# Patient Record
Sex: MALE | Race: WHITE | NOT HISPANIC OR LATINO | Employment: OTHER | URBAN - METROPOLITAN AREA
[De-identification: names, ages, dates, MRNs, and addresses within clinical notes are randomized per-mention and may not be internally consistent; named-entity substitution may affect disease eponyms.]

---

## 2017-12-26 ENCOUNTER — HOSPITAL ENCOUNTER (EMERGENCY)
Facility: CLINIC | Age: 67
Discharge: HOME | End: 2017-12-26
Attending: EMERGENCY MEDICINE | Admitting: EMERGENCY MEDICINE
Payer: MEDICARE

## 2017-12-26 VITALS
HEART RATE: 66 BPM | HEIGHT: 70 IN | WEIGHT: 195 LBS | DIASTOLIC BLOOD PRESSURE: 77 MMHG | SYSTOLIC BLOOD PRESSURE: 133 MMHG | BODY MASS INDEX: 27.92 KG/M2 | TEMPERATURE: 97.7 F | RESPIRATION RATE: 16 BRPM | OXYGEN SATURATION: 99 %

## 2017-12-26 DIAGNOSIS — S61.012A LACERATION OF LEFT THUMB WITHOUT FOREIGN BODY, NAIL DAMAGE STATUS UNSPECIFIED, INITIAL ENCOUNTER: ICD-10-CM

## 2017-12-26 PROCEDURE — 99283 EMERGENCY DEPT VISIT LOW MDM: CPT

## 2017-12-26 ASSESSMENT — ENCOUNTER SYMPTOMS
WOUND: 1
COLOR CHANGE: 0
WEAKNESS: 0
JOINT SWELLING: 0
NUMBNESS: 0

## 2017-12-26 NOTE — ED AVS SNAPSHOT
Emergency Department    [address] [place] [address]    Phone:  [phone]    Fax:  [phone]                                       Beni Chau   MRN: [id number]    Department:   Emergency Department   Date of Visit:  12/26/2017           After Visit Summary Signature Page     I have received my discharge instructions, and my questions have been answered. I have discussed any challenges I see with this plan with the nurse or doctor.    ..........................................................................................................................................  Patient/Patient Representative Signature      ..........................................................................................................................................  Patient Representative Print Name and Relationship to Patient    ..................................................               ................................................  Date                                            Time    ..........................................................................................................................................  Reviewed by Signature/Title    ...................................................              ..............................................  Date                                                            Time

## 2017-12-26 NOTE — ED AVS SNAPSHOT
Emergency Department    [address] [facility] [address]    Phone:  [phone]    Fax:  [phone]                                       Beni Chau   MRN: [id number]    Department:   Emergency Department   Date of Visit:  12/26/2017           Patient Information     Date Of Birth          1950        Your diagnoses for this visit were:     Laceration of left thumb without foreign body, nail damage status unspecified, initial encounter        You were seen by Dejan De Jesus MD.      Follow-up Information     Follow up with Deion Shah.    Specialty:  Neurosurgery    Why:  As needed, If symptoms worsen    Contact information:    [facility]  [address] [place] [id number] [phone]          Follow up with  Emergency Department.    Specialty:  EMERGENCY MEDICINE    Why:  As needed, If symptoms worsen    Contact information:    [address] [facility] [id number] [phone]        Discharge Instructions          * LACERATION (All Closures)  A laceration is a cut through the skin. This will usually require stitches (sutures) or staples if it is deep. Minor cuts may be treated with a tape closure ( Steri-Strips ) or Dermabond skin glue.       HOME CARE:  PAIN MEDICINE: You may use acetaminophen (Tylenol) 650-1000 mg every 6 hours or ibuprofen (Motrin, Advil) 600 mg every 6-8 hours with food to control pain, if you are able to take these medicines. [NOTE: If you have chronic liver or kidney disease or ever had a stomach ulcer or GI bleeding, talk with your doctor before using these medicines.]  EXTREMITY, FACE or TRUNK WOUNDS:    Keep the wound clean and dry. If a bandage was applied and it becomes wet or dirty, replace it. Otherwise, leave it in place for the first 24 hours.    If stitches or staples were used, clean the wound daily. Protect the wound from sunlight and tanning lamps.    After removing the bandage, wash the area  with soap and water. Use a wet cotton swab (Q tip) to loosen and remove any blood or crust that forms.    After cleaning, apply a thin layer of Polysporin or Bacitracin ointment. This will keep the wound clean and make it easier to remove the stitches or staples. Reapply a fresh bandage.    You may remove the bandage to shower as usual after the first 24 hours, but do not soak the area in water (no swimming) until the stitches or staples are removed.    If Steri-Strips were used, keep the area clean and dry. If it becomes wet, blot it dry with a towel. It is okay to take a brief shower, but avoid scrubbing the area.    If Dermabond skin adhesive was used, do not scratch, rub or pick at the adhesive film. Do not place tape directly over the film. Do not apply liquid, ointment or creams to the wound while the film is in place. Do not clean the wound with peroxide and do not apply ointments. Avoid activities that cause heavy sweating until the film has fallen off. Protect the wound from prolonged exposure to sunlight or tanning lamps. You may shower as usual but do not soak the wound in water (no baths or swimming). The film will fall off by itself in 5-10 days.  SCALP WOUNDS: During the first two days, you may carefully rinse your hair in the shower to remove blood, glass or dirt particles. After two days, you may shower and shampoo your hair normally. Do not soak your scalp in the tub or go swimming until the stitches or staples have been removed.  MOUTH WOUNDS: Eat soft foods to reduce pain. If the cut is inside of your mouth, clean by rinsing after each meal and at bedtime with a mixture of equal parts water and Hydrogen Peroxide (do not swallow!). Or, you can use a cotton swab to directly apply Hydrogen Peroxide onto the cut.  After the wound is done healing, use sunscreen over the area whenever exposed for the next 6 minths to avoid a darker scar.     FOLLOW UP: Most skin wounds heal within ten days. Mouth and  facial wounds heal within five days. However, even with proper treatment, a wound infection may sometimes occur. Therefore, you should check the wound daily for signs of infection listed below.  Stitches should be removed from the face within five days; stitches and staples should be removed from other parts of the body within 7-10 days. Unless you are told to come back to the emergency room, you may have your doctor or urgent care remove the stitches. If dissolving stitches were used in the mouth, these will fall out or dissolve without the need for removal. If tape closures ( Steri-Strips ) were used, remove them yourself if they have not fallen off after 7 days. If Dermabond skin glue was used, the film will fall off by itself in 5-10 days.      GET PROMPT MEDICAL ATTENTION  if any of the following occur:    Increasing pain in the wound    Redness, swelling or pus coming from the wound    Fever over 101 F (38.3 C) oral    If stitches or staples come apart or fall out or if Steri-Strips fall off before seven days    If the wound edges re-open    Bleeding not controlled by direct pressure    0138-9135 [employer] [address] [place] [address]. All rights reserved. This information is not intended as a substitute for professional medical care. Always follow your healthcare professional's instructions.  This information has been modified by your health care provider with permission from the publisher.      Old Laceration: Not Sutured  A laceration is a cut through the skin. This will usually require stitches if it is deep. However, if a laceration remains open for too long, the risk of infection increases. In your case, too much time has passed before coming for treatment. The danger of infection from stitching at this time is too high. That is why your wound was not stitched.  If the wound is spread open, it will heal by filling in from the bottom and sides. A wound that is not stitched  may take 1 to 4 weeks to heal, depending on the size of the opening. You will probably have a visible scar. You can discuss revision of the scar with your healthcare provider at a later time.  Home care  The following guidelines will help you care for your laceration at home:    Keep the wound clean and dry. If a bandage was applied and it becomes wet or dirty, replace it. Otherwise, leave it in place for the first 24 hours, then change it once a day or as directed.    Clean the wound daily:    After removing any bandage, wash the area with soap and water. Use a wet cotton swab to loosen and remove any blood or crust that forms.    Talk with your healthcare provider before applying any antibiotic ointment to the wound. Reapply a fresh bandage.    You may remove the bandage to shower as usual after the first 24 hours, but don't soak the area in water (no tub baths or swimming) until the wound heals or your provider tells you it's OK.  Avoid activities that may reinjure your wound.    The healthcare provider may prescribe an antibiotic cream or ointment to prevent infection.     If you are at high risk for infection, or the wound is grossly contaminated, your provider may prescribe an oral antibiotic medicine to prevent infection. Don't stop using this medicine until you have finished the prescribed course, or the provider tells you to stop.    The healthcare provider may also prescribe medicine for pain. Follow instructions for taking these medicines.    Check the wound daily for signs of infection listed below.  Follow-up care  Follow up with your healthcare provider, or as advised.  When to seek medical advice  Call your healthcare provider right away if any of these occur:    Wound bleeding not controlled by direct pressure    Signs of infection, including increasing pain in the wound, increasing wound redness or swelling, or pus or bad odor coming from the wound    Fever of 100.4 F (38. C) or higher or as  directed by your healthcare provider    Wound edges re-open    Wound changes colors    Numbness around the wound     Decreased movement around the injured area  Date Last Reviewed: 6/10/2015    2894-4750 [employer]. [address] [place] [address], [place] [address]. All rights reserved. This information is not intended as a substitute for professional medical care. Always follow your healthcare professional's instructions.          24 Hour Appointment Hotline       To make an appointment at any [facility], call [phone] ([phone]). If you don't have a family doctor or clinic, we will help you find one. [facility] clinics are conveniently located to serve the needs of you and your family.             Review of your medicines      Our records show that you are taking the medicines listed below. If these are incorrect, please call your family doctor or clinic.        Dose / Directions Last dose taken    aspirin 81 MG EC tablet   Dose:  81 mg   Indication:  Coronary artery disease        Take 1 tablet (81 mg) by mouth daily   Refills:  0        clopidogrel 75 MG tablet   Commonly known as:  PLAVIX   Dose:  75 mg   Quantity:  30 tablet        Take 1 tablet (75 mg) by mouth daily   Refills:  0        LISINOPRIL PO        Refills:  0        METOPROLOL SUCCINATE PO   Dose:  50 mg        Take 50 mg by mouth daily   Refills:  0        NITROSTAT SL   Dose:  0.4 mg        Place 0.4 mg under the tongue   Refills:  0        SIMVASTATIN PO   Dose:  40 mg        Take 40 mg by mouth At Bedtime   Refills:  0        VITAMIN D (CHOLECALCIFEROL) PO   Dose:  1000 Units        Take 1,000 Units by mouth daily   Refills:  0                Orders Needing Specimen Collection     None      Pending Results     No orders found from 12/24/2017 to 12/27/2017.            Pending Culture Results     No orders found from 12/24/2017 to 12/27/2017.            Pending Results Instructions     If you had any lab results that were  not finalized at the time of your Discharge, you can call the ED Lab Result RN at [phone]. You will be contacted by this team for any positive Lab results or changes in treatment. The nurses are available 7 days a week from 10A to 6:30P.  You can leave a message 24 hours per day and they will return your call.        Test Results From Your Hospital Stay               Clinical Quality Measure: Blood Pressure Screening     Your blood pressure was checked while you were in the emergency department today. The last reading we obtained was  BP: 133/77 . Please read the guidelines below about what these numbers mean and what you should do about them.  If your systolic blood pressure (the top number) is less than 120 and your diastolic blood pressure (the bottom number) is less than 80, then your blood pressure is normal. There is nothing more that you need to do about it.  If your systolic blood pressure (the top number) is 120-139 or your diastolic blood pressure (the bottom number) is 80-89, your blood pressure may be higher than it should be. You should have your blood pressure rechecked within a year by a primary care provider.  If your systolic blood pressure (the top number) is 140 or greater or your diastolic blood pressure (the bottom number) is 90 or greater, you may have high blood pressure. High blood pressure is treatable, but if left untreated over time it can put you at risk for heart attack, stroke, or kidney failure. You should have your blood pressure rechecked by a primary care provider within the next 4 weeks.  If your provider in the emergency department today gave you specific instructions to follow-up with your doctor or provider even sooner than that, you should follow that instruction and not wait for up to 4 weeks for your follow-up visit.        Thank you for choosing [facility]       Thank you for choosing [facility] for your care. Our goal is always to provide you with excellent care. Hearing  "back from our patients is one way we can continue to improve our services. Please take a few minutes to complete the written survey that you may receive in the mail after you visit with us. Thank you!        [facility] Information     [employer] lets you send messages to your doctor, view your test results, renew your prescriptions, schedule appointments and more. To sign up, go to [email or web address] . Click on \"Log in\" on the left side of the screen, which will take you to the Welcome page. Then click on \"Sign up Now\" on the right side of the page.     You will be asked to enter the access code listed below, as well as some personal information. Please follow the directions to create your username and password.     Your access code is: [email or web address]  Expires: 3/26/2018  6:44 AM     Your access code will  in 90 days. If you need help or a new code, please call your [facility] clinic or [phone].        Care EveryWhere ID     This is your Care EveryWhere ID. This could be used by other organizations to access your [facility] medical records  [id number]        Equal Access to Services     MARIANN GIRON : Hadjason Villalobos, mode [place], nevaeh wilkinson, claudia barajas . So [facility] [phone].    ATENCIÓN: Si habla español, tiene a barnes disposición servicios gratuitos de asistencia lingüística. Ford al [phone].    We comply with applicable federal civil rights laws and [place] laws. We do not discriminate on the basis of race, color, national origin, age, disability, sex, sexual orientation, or gender identity.            After Visit Summary       This is your record. Keep this with you and show to your community pharmacist(s) and doctor(s) at your next visit.                  "

## 2017-12-26 NOTE — DISCHARGE INSTRUCTIONS
* LACERATION (All Closures)  A laceration is a cut through the skin. This will usually require stitches (sutures) or staples if it is deep. Minor cuts may be treated with a tape closure ( Steri-Strips ) or Dermabond skin glue.       HOME CARE:  PAIN MEDICINE: You may use acetaminophen (Tylenol) 650-1000 mg every 6 hours or ibuprofen (Motrin, Advil) 600 mg every 6-8 hours with food to control pain, if you are able to take these medicines. [NOTE: If you have chronic liver or kidney disease or ever had a stomach ulcer or GI bleeding, talk with your doctor before using these medicines.]  EXTREMITY, FACE or TRUNK WOUNDS:    Keep the wound clean and dry. If a bandage was applied and it becomes wet or dirty, replace it. Otherwise, leave it in place for the first 24 hours.    If stitches or staples were used, clean the wound daily. Protect the wound from sunlight and tanning lamps.    After removing the bandage, wash the area with soap and water. Use a wet cotton swab (Q tip) to loosen and remove any blood or crust that forms.    After cleaning, apply a thin layer of Polysporin or Bacitracin ointment. This will keep the wound clean and make it easier to remove the stitches or staples. Reapply a fresh bandage.    You may remove the bandage to shower as usual after the first 24 hours, but do not soak the area in water (no swimming) until the stitches or staples are removed.    If Steri-Strips were used, keep the area clean and dry. If it becomes wet, blot it dry with a towel. It is okay to take a brief shower, but avoid scrubbing the area.    If Dermabond skin adhesive was used, do not scratch, rub or pick at the adhesive film. Do not place tape directly over the film. Do not apply liquid, ointment or creams to the wound while the film is in place. Do not clean the wound with peroxide and do not apply ointments. Avoid activities that cause heavy sweating until the film has fallen off. Protect the wound from prolonged  exposure to sunlight or tanning lamps. You may shower as usual but do not soak the wound in water (no baths or swimming). The film will fall off by itself in 5-10 days.  SCALP WOUNDS: During the first two days, you may carefully rinse your hair in the shower to remove blood, glass or dirt particles. After two days, you may shower and shampoo your hair normally. Do not soak your scalp in the tub or go swimming until the stitches or staples have been removed.  MOUTH WOUNDS: Eat soft foods to reduce pain. If the cut is inside of your mouth, clean by rinsing after each meal and at bedtime with a mixture of equal parts water and Hydrogen Peroxide (do not swallow!). Or, you can use a cotton swab to directly apply Hydrogen Peroxide onto the cut.  After the wound is done healing, use sunscreen over the area whenever exposed for the next 6 minths to avoid a darker scar.     FOLLOW UP: Most skin wounds heal within ten days. Mouth and facial wounds heal within five days. However, even with proper treatment, a wound infection may sometimes occur. Therefore, you should check the wound daily for signs of infection listed below.  Stitches should be removed from the face within five days; stitches and staples should be removed from other parts of the body within 7-10 days. Unless you are told to come back to the emergency room, you may have your doctor or urgent care remove the stitches. If dissolving stitches were used in the mouth, these will fall out or dissolve without the need for removal. If tape closures ( Steri-Strips ) were used, remove them yourself if they have not fallen off after 7 days. If Dermabond skin glue was used, the film will fall off by itself in 5-10 days.      GET PROMPT MEDICAL ATTENTION  if any of the following occur:    Increasing pain in the wound    Redness, swelling or pus coming from the wound    Fever over 101 F (38.3 C) oral    If stitches or staples come apart or fall out or if Steri-Strips fall  off before seven days    If the wound edges re-open    Bleeding not controlled by direct pressure    7089-7943 [employer]. [address] [place] [address], [place] [address]. All rights reserved. This information is not intended as a substitute for professional medical care. Always follow your healthcare professional's instructions.  This information has been modified by your health care provider with permission from the publisher.      Old Laceration: Not Sutured  A laceration is a cut through the skin. This will usually require stitches if it is deep. However, if a laceration remains open for too long, the risk of infection increases. In your case, too much time has passed before coming for treatment. The danger of infection from stitching at this time is too high. That is why your wound was not stitched.  If the wound is spread open, it will heal by filling in from the bottom and sides. A wound that is not stitched may take 1 to 4 weeks to heal, depending on the size of the opening. You will probably have a visible scar. You can discuss revision of the scar with your healthcare provider at a later time.  Home care  The following guidelines will help you care for your laceration at home:    Keep the wound clean and dry. If a bandage was applied and it becomes wet or dirty, replace it. Otherwise, leave it in place for the first 24 hours, then change it once a day or as directed.    Clean the wound daily:    After removing any bandage, wash the area with soap and water. Use a wet cotton swab to loosen and remove any blood or crust that forms.    Talk with your healthcare provider before applying any antibiotic ointment to the wound. Reapply a fresh bandage.    You may remove the bandage to shower as usual after the first 24 hours, but don't soak the area in water (no tub baths or swimming) until the wound heals or your provider tells you it's OK.  Avoid activities that may reinjure your wound.    The  healthcare provider may prescribe an antibiotic cream or ointment to prevent infection.     If you are at high risk for infection, or the wound is grossly contaminated, your provider may prescribe an oral antibiotic medicine to prevent infection. Don't stop using this medicine until you have finished the prescribed course, or the provider tells you to stop.    The healthcare provider may also prescribe medicine for pain. Follow instructions for taking these medicines.    Check the wound daily for signs of infection listed below.  Follow-up care  Follow up with your healthcare provider, or as advised.  When to seek medical advice  Call your healthcare provider right away if any of these occur:    Wound bleeding not controlled by direct pressure    Signs of infection, including increasing pain in the wound, increasing wound redness or swelling, or pus or bad odor coming from the wound    Fever of 100.4 F (38. C) or higher or as directed by your healthcare provider    Wound edges re-open    Wound changes colors    Numbness around the wound     Decreased movement around the injured area  Date Last Reviewed: 6/10/2015    9285-9252 [employer]. [address] [place] [address]. All rights reserved. This information is not intended as a substitute for professional medical care. Always follow your healthcare professional's instructions.

## 2017-12-26 NOTE — ED PROVIDER NOTES
"  History     Chief Complaint:  Laceration    HPI   Beni Chau is a 67 year old male who presents with a laceration. The patient presents stating that he accidentally cut his thumb on a knife last night at approximately 2000. He state she is on Plavix chronically. He denies any numbness, weakness, other injuries or symptoms.    Allergies:  Aleve  Oxycodone     Medications:    LISINOPRIL PO  clopidogrel (PLAVIX) 75 MG tablet  aspirin 81 MG EC tablet  Nitroglycerin (NITROSTAT SL)  METOPROLOL SUCCINATE PO  SIMVASTATIN PO  VITAMIN D, CHOLECALCIFEROL, PO    Problem List:    GI bleed     Past Medical History:    Hypertension    Past Surgical History:    Esophagoscopy, Gastroscopy, Duodenoscopy, Combined  Orthopedic surgery    Family History:    No pertinent family history.    Social History:  Smoking status: Former smoker  Alcohol use: Yes  Marital Status:  Single      Review of Systems   Musculoskeletal: Negative for joint swelling.   Skin: Positive for wound. Negative for color change.   Neurological: Negative for weakness and numbness.     Physical Exam     Patient Vitals for the past 24 hrs:   BP Temp Temp src Pulse Resp SpO2 Height Weight   12/26/17 0611 133/77 97.7  F (36.5  C) Oral 66 16 99 % 1.778 m (5' 10\") -   12/26/17 0608 - - - - - - - 88.5 kg (195 lb)         Physical Exam  General: Alert, interactive in mild distress  Head:  Scalp is atraumatic  Eyes:  The pupils are equal, round, and reactive to light    EOM's intact    No scleral icterus  ENT:      Nose:  The external nose is normal  Ears:  External ears are normal  Mouth/Throat: The oropharynx is normal    Mucus membranes are moist      Neck:  Normal range of motion.      There is no rigidity.    Trachea is in the midline         CV:  Regular rate and rhythm    No murmur   Resp:  Breath sounds are clear bilaterally    Non-labored, no retractions or accessory muscle use     MS:  Normal strength in all 4 extremities  Skin:  Warm and dry, No rash or " lesions noted. Distal tip of left thumb with 5mm x 5mm avulsion.  Neuro: Strength 5/5 x4.  Sensation intact  In all 4 extremities.        Psych:  Awake. Alert.  Normal affect.      Appropriate interactions.      Emergency Department Course   Emergency Department Course:  Nursing notes and vitals reviewed.  (0624) I performed an exam of the patient as documented above.    Findings and plan explained to the patient. Patient discharged home with instructions regarding supportive care, medications, and reasons to return. The importance of close follow-up was reviewed.  Impression & Plan    Medical Decision Making:  Findings and exam were consistent with uncomplicated laceration of the left thumb.. The wound was carefully evaluated and explored. Gel foam and sterile dressing applied to the area. There is no evidence at this time of bony injury, foreign body, deep space infection, or neurovascular injury. Follow up in 2-3 days time if concerns over healing. Possible complications (infection, scarring) were reviewed with the patient. Indications for immediate return to ER were reviewed and included but are not limited to, redness, fevers, drainage, increasing pain, high fevers, or other concerns.    Diagnosis:    ICD-10-CM   1. Laceration of left thumb without foreign body, nail damage status unspecified, initial encounter S61.012A       Disposition:  Patient is discharged to home.        I, Pablo Laura, am serving as a scribe on 12/26/2017 at 6:24 AM to personally document services performed by Dr. De Jesus based on my observations and the provider's statements to me.      Pablo Laura  12/26/2017    EMERGENCY DEPARTMENT       Neal, Dejan Paul MD  12/26/17 1542

## 2023-05-02 ENCOUNTER — APPOINTMENT (OUTPATIENT)
Dept: CT IMAGING | Facility: CLINIC | Age: 73
End: 2023-05-02
Attending: EMERGENCY MEDICINE
Payer: COMMERCIAL

## 2023-05-02 ENCOUNTER — HOSPITAL ENCOUNTER (EMERGENCY)
Facility: CLINIC | Age: 73
Discharge: HOME | End: 2023-05-02
Attending: EMERGENCY MEDICINE | Admitting: EMERGENCY MEDICINE
Payer: COMMERCIAL

## 2023-05-02 VITALS
BODY MASS INDEX: 30.35 KG/M2 | HEIGHT: 70 IN | SYSTOLIC BLOOD PRESSURE: 141 MMHG | TEMPERATURE: 97 F | WEIGHT: 212 LBS | DIASTOLIC BLOOD PRESSURE: 88 MMHG | HEART RATE: 61 BPM | RESPIRATION RATE: 16 BRPM | OXYGEN SATURATION: 96 %

## 2023-05-02 DIAGNOSIS — K80.20 CALCULUS OF GALLBLADDER WITHOUT CHOLECYSTITIS WITHOUT OBSTRUCTION: ICD-10-CM

## 2023-05-02 DIAGNOSIS — K40.90 RIGHT INGUINAL HERNIA: ICD-10-CM

## 2023-05-02 DIAGNOSIS — R39.11 URINARY HESITANCY: ICD-10-CM

## 2023-05-02 DIAGNOSIS — N40.0 ENLARGED PROSTATE: ICD-10-CM

## 2023-05-02 LAB
ALBUMIN SERPL BCG-MCNC: 4.5 G/DL (ref 3.5–5.2)
ALBUMIN UR-MCNC: NEGATIVE MG/DL
ALP SERPL-CCNC: 114 U/L (ref 40–129)
ALT SERPL W P-5'-P-CCNC: 29 U/L (ref 10–50)
ANION GAP SERPL CALCULATED.3IONS-SCNC: 14 MMOL/L (ref 7–15)
APPEARANCE UR: CLEAR
AST SERPL W P-5'-P-CCNC: 25 U/L (ref 10–50)
BASOPHILS # BLD AUTO: 0 10E3/UL (ref 0–0.2)
BASOPHILS NFR BLD AUTO: 1 %
BILIRUB SERPL-MCNC: 0.4 MG/DL
BILIRUB UR QL STRIP: NEGATIVE
BUN SERPL-MCNC: 14.9 MG/DL (ref 8–23)
CALCIUM SERPL-MCNC: 8.9 MG/DL (ref 8.8–10.2)
CHLORIDE SERPL-SCNC: 96 MMOL/L (ref 98–107)
COLOR UR AUTO: ABNORMAL
CREAT SERPL-MCNC: 0.92 MG/DL (ref 0.67–1.17)
DEPRECATED HCO3 PLAS-SCNC: 23 MMOL/L (ref 22–29)
EOSINOPHIL # BLD AUTO: 0.2 10E3/UL (ref 0–0.7)
EOSINOPHIL NFR BLD AUTO: 3 %
ERYTHROCYTE [DISTWIDTH] IN BLOOD BY AUTOMATED COUNT: 13.4 % (ref 10–15)
GFR SERPL CREATININE-BSD FRML MDRD: 87 ML/MIN/1.73M2
GLUCOSE SERPL-MCNC: 83 MG/DL (ref 70–99)
GLUCOSE UR STRIP-MCNC: NEGATIVE MG/DL
HCT VFR BLD AUTO: 36.4 % (ref 40–53)
HGB BLD-MCNC: 12.4 G/DL (ref 13.3–17.7)
HGB UR QL STRIP: ABNORMAL
IMM GRANULOCYTES # BLD: 0 10E3/UL
IMM GRANULOCYTES NFR BLD: 0 %
KETONES UR STRIP-MCNC: ABNORMAL MG/DL
LEUKOCYTE ESTERASE UR QL STRIP: NEGATIVE
LYMPHOCYTES # BLD AUTO: 2.4 10E3/UL (ref 0.8–5.3)
LYMPHOCYTES NFR BLD AUTO: 33 %
MCH RBC QN AUTO: 30.8 PG (ref 26.5–33)
MCHC RBC AUTO-ENTMCNC: 34.1 G/DL (ref 31.5–36.5)
MCV RBC AUTO: 91 FL (ref 78–100)
MONOCYTES # BLD AUTO: 0.6 10E3/UL (ref 0–1.3)
MONOCYTES NFR BLD AUTO: 9 %
MUCOUS THREADS #/AREA URNS LPF: PRESENT /LPF
NEUTROPHILS # BLD AUTO: 4 10E3/UL (ref 1.6–8.3)
NEUTROPHILS NFR BLD AUTO: 54 %
NITRATE UR QL: NEGATIVE
NRBC # BLD AUTO: 0 10E3/UL
NRBC BLD AUTO-RTO: 0 /100
PH UR STRIP: 5.5 [PH] (ref 5–7)
PLATELET # BLD AUTO: 291 10E3/UL (ref 150–450)
POTASSIUM SERPL-SCNC: 4.3 MMOL/L (ref 3.4–5.3)
PROT SERPL-MCNC: 7 G/DL (ref 6.4–8.3)
RBC # BLD AUTO: 4.02 10E6/UL (ref 4.4–5.9)
RBC URINE: 2 /HPF
SODIUM SERPL-SCNC: 133 MMOL/L (ref 136–145)
SP GR UR STRIP: 1.03 (ref 1–1.03)
SQUAMOUS EPITHELIAL: <1 /HPF
UROBILINOGEN UR STRIP-MCNC: NORMAL MG/DL
WBC # BLD AUTO: 7.3 10E3/UL (ref 4–11)
WBC URINE: <1 /HPF

## 2023-05-02 PROCEDURE — 36415 COLL VENOUS BLD VENIPUNCTURE: CPT | Performed by: EMERGENCY MEDICINE

## 2023-05-02 PROCEDURE — 81003 URINALYSIS AUTO W/O SCOPE: CPT | Performed by: EMERGENCY MEDICINE

## 2023-05-02 PROCEDURE — 250N000009 HC RX 250: Performed by: EMERGENCY MEDICINE

## 2023-05-02 PROCEDURE — 99285 EMERGENCY DEPT VISIT HI MDM: CPT | Mod: 25

## 2023-05-02 PROCEDURE — 85025 COMPLETE CBC W/AUTO DIFF WBC: CPT | Performed by: EMERGENCY MEDICINE

## 2023-05-02 PROCEDURE — 250N000011 HC RX IP 250 OP 636: Performed by: EMERGENCY MEDICINE

## 2023-05-02 PROCEDURE — G1010 CDSM STANSON: HCPCS

## 2023-05-02 PROCEDURE — 80053 COMPREHEN METABOLIC PANEL: CPT | Performed by: EMERGENCY MEDICINE

## 2023-05-02 RX ORDER — IOPAMIDOL 755 MG/ML
106 INJECTION, SOLUTION INTRAVASCULAR ONCE
Status: COMPLETED | OUTPATIENT
Start: 2023-05-02 | End: 2023-05-02

## 2023-05-02 RX ADMIN — IOPAMIDOL 106 ML: 755 INJECTION, SOLUTION INTRAVENOUS at 15:21

## 2023-05-02 RX ADMIN — SODIUM CHLORIDE 70 ML: 9 INJECTION, SOLUTION INTRAVENOUS at 15:21

## 2023-05-02 NOTE — ED TRIAGE NOTES
Pt BIBA from home with c/o LRQ abdominal pain and groin pain. Pain has been on-going but recently got worse. Patient also reports difficulty urinating.

## 2023-05-02 NOTE — ED PROVIDER NOTES
"  History     Chief Complaint:  Abdominal Pain and Groin Pain     The history is provided by the patient.      Beni Chau is a 72 year old male who presents to the ED via EMS for evaluation of several weeks of intermittent groin pain. The patient reports that he began experiencing intermittent right sided groin pain a couple months ago, worsening today. No known exacerbating factors or injuries. Patient states he has a history of left sided inguinal hernia which was repaired, but since this hernia he has had intermittent difficulty with urination and bowel movements. He reports a weak stream and difficulty urinating. He states he has intermittent constipation for several days and then will have multiple large bowel movements one day. He denies blood in stool. He reports no vomiting, nausea, fever, or testicular pain. No upper abdominal pain. Since arrival to the ED he reports that the right groin pain has now resolved. At times he does note a bulge in this area. He denies any other symptoms.       Independent Historian:   None - Patient Only    Review of External Notes:  none    ROS:  See HPI    Allergies:  Aleve  Oxycodone     Physical Exam     Patient Vitals for the past 24 hrs:   BP Temp Pulse Resp SpO2 Height Weight   05/02/23 1800 (!) 141/88 -- 61 -- 96 % -- --   05/02/23 1424 111/64 97  F (36.1  C) 68 16 95 % 1.778 m (5' 10\") 96.2 kg (212 lb)      Physical Exam  General: Well appearing, nontoxic. Resting comfortably  Head:  Scalp, face, and head appear normal  Eyes:  Pupils are equal, round    Conjunctivae non-injected and sclerae white  ENT:    The external nose is normal    Pinnae are normal  Neck:  Normal range of motion    There is no rigidity noted    Trachea is in the midline  CV:  Regular rate and rhythm     Normal S1/S2, no S3/S4    No murmur or rub. Radial pulses 2+ bilaterally.  Resp:  Lungs are clear and equal bilaterally  There is no tachypnea    No increased work of breathing    No rales, " wheezing, or rhonchi  GI:  Abdomen is soft, no rigidity or guarding    No distension, or mass    No tenderness or rebound tenderness  :  Normal circumcised male genitalia. Urethral meatus normal without bleeding or discharge. No lesions or rash. Testes non-tender to palpation in normal lie. No masses or swelling. No erythema. No palpable inguinal hernias.   MS:  Normal muscular tone  Skin:  No rash or acute skin lesions noted  Neuro: Awake and alert  Speech is normal and fluent  Moves all extremities spontaneously  Psych:  Normal affect. Appropriate interactions.      Emergency Department Course     Imaging:  CT Abdomen Pelvis w Contrast   Final Result   IMPRESSION:    1.  No acute abnormality identified.   2.  Small cholelithiasis.   3.  Colonic diverticula without diverticulitis.    4.  Small fat-containing right inguinal hernia without herniated   bowel.       ANDREW MAHER MD            SYSTEM ID:  [id number]         Report per radiology    Laboratory:  Labs Ordered and Resulted from Time of ED Arrival to Time of ED Departure   COMPREHENSIVE METABOLIC PANEL - Abnormal       Result Value    Sodium 133 (*)     Potassium 4.3      Chloride 96 (*)     Carbon Dioxide (CO2) 23      Anion Gap 14      Urea Nitrogen 14.9      Creatinine 0.92      Calcium 8.9      Glucose 83      Alkaline Phosphatase 114      AST 25      ALT 29      Protein Total 7.0      Albumin 4.5      Bilirubin Total 0.4      GFR Estimate 87     ROUTINE UA WITH MICROSCOPIC REFLEX TO CULTURE - Abnormal    Color Urine Light Yellow      Appearance Urine Clear      Glucose Urine Negative      Bilirubin Urine Negative      Ketones Urine Trace (*)     Specific Gravity Urine 1.026      Blood Urine Trace (*)     pH Urine 5.5      Protein Albumin Urine Negative      Urobilinogen Urine Normal      Nitrite Urine Negative      Leukocyte Esterase Urine Negative      Mucus Urine Present (*)     RBC Urine 2      WBC Urine <1      Squamous Epithelials Urine <1     CBC  WITH PLATELETS AND DIFFERENTIAL - Abnormal    WBC Count 7.3      RBC Count 4.02 (*)     Hemoglobin 12.4 (*)     Hematocrit 36.4 (*)     MCV 91      MCH 30.8      MCHC 34.1      RDW 13.4      Platelet Count 291      % Neutrophils 54      % Lymphocytes 33      % Monocytes 9      % Eosinophils 3      % Basophils 1      % Immature Granulocytes 0      NRBCs per 100 WBC 0      Absolute Neutrophils 4.0      Absolute Lymphocytes 2.4      Absolute Monocytes 0.6      Absolute Eosinophils 0.2      Absolute Basophils 0.0      Absolute Immature Granulocytes 0.0      Absolute NRBCs 0.0        Emergency Department Course & Assessments:     Interventions:  Medications   iopamidol (ISOVUE-370) solution 106 mL (106 mLs Intravenous $Given 5/2/23 1521)   Saline Flush (70 mLs Intravenous $Given 5/2/23 1521)      Assessments, Independent Interpretation, Consult/Discussion of Management/Tests:  ED Course as of 05/03/23 1034   Tue May 02, 2023   1810 I obtained history and examined the patient as noted above.      Social Determinants of Health affecting care:  None    Disposition:  The patient was discharged to home.     Impression & Plan      Medical Decision Making:  Beni Chau is a 72 year old male who presents to the ED for evaluation of intermittent right groin pain.  ED evaluation is consistent with right inguinal hernia.  Since arrival to the emergency department the pain has completely resolved.  CT scan shows persistent fat-containing right inguinal hernia although this is not appreciated on clinical examination.  No evidence of bowel obstruction or strangulation.  CT scan incidentally also noted cholelithiasis.  Patient had no upper abdominal pain whatsoever and this is asymptomatic and incidental at this time.  Patient also endorses ongoing urinary hesitancy, difficulty urinating and weak urinary stream.  CT scan shows prostatic enlargement.  Patient will need follow-up with urology for evaluation of these lower urinary  tract symptoms and evaluation for prostatic enlargement.  Laboratory studies are reassuring.  Findings and plan of care were discussed with the patient.  I recommended follow-up with general surgery in regards to the hernia and incidentally noted cholelithiasis.  Urology follow-up for urinary symptoms and enlarged prostate.  Referrals were placed for both.  No indication for hospitalization at this time.  Symptoms have completely resolved.  Patient is agreeable to plan of care.  Close return precautions are provided he was discharged in stable and improved condition.      Diagnosis:    ICD-10-CM    1. Right inguinal hernia  K40.90 Adult General Surg Referral    This was the cause of your pain today.      2. Enlarged prostate  N40.0 Adult Urology  Referral      3. Urinary hesitancy  R39.11 Adult Urology  Referral      4. Calculus of gallbladder without cholecystitis without obstruction  K80.20 Adult General Surg Referral    Incidentally noted on CT scan -follow-up with your primary care doctor or general surgeon.         I, Nick Blackwell, am serving as a scribe at 6:17 PM on 5/2/2023 to document services personally performed by Dr. Parnell, based on my observations and the provider's statements to me.     5/2/2023   Joselo Parnell MD     Historical Data:  ______________________________________________________________________  Medications:    aspirin 81 MG EC tablet  clopidogrel (PLAVIX) 75 MG tablet  LISINOPRIL PO  METOPROLOL SUCCINATE PO  Nitroglycerin (NITROSTAT SL)  SIMVASTATIN PO  VITAMIN D, CHOLECALCIFEROL, PO      Past Medical History:   Past Surgical History:     Past Medical History:   Diagnosis Date     Hypertension     Past Surgical History:   Procedure Laterality Date     ESOPHAGOSCOPY, GASTROSCOPY, DUODENOSCOPY (EGD), COMBINED N/A 10/7/2015    Procedure: COMBINED ESOPHAGOSCOPY, GASTROSCOPY, DUODENOSCOPY (EGD), BIOPSY SINGLE OR MULTIPLE;  Surgeon: Robert Baugh MD;  Location: [facility]      ORTHOPEDIC SURGERY        Patient Active Problem List    Diagnosis Date Noted     Right inguinal hernia 05/02/2023     Priority: Medium     Urinary hesitancy 05/02/2023     Priority: Medium     Enlarged prostate 05/02/2023     Priority: Medium     Calculus of gallbladder without cholecystitis without obstruction 05/02/2023     Priority: Medium     GI bleed 10/07/2015     Priority: Medium          Family History:    family history is not on file. Social History:   reports that he quit smoking about 13 years ago. He does not have any smokeless tobacco history on file. He reports current alcohol use.     PCP: Deion Shah Ryan Clay, MD  05/03/23 1035

## 2023-05-02 NOTE — ED NOTES
Rapid Assessment Note    History:   Beni Chau is a 72 year old male who presents with several weeks of intermittent right groin pain.  Feels there is a bulge or palpable nodule in this area but it seems to come and go.  Today the pain was significantly worse therefore he called EMS to present to the hospital for evaluation.  No fevers or vomiting.  He reports he has difficulty with bowel movements where he will be constipated for several days before having a day of multiple large bowel movements.  No bloody stool.  He endorses difficulty urinating with frequent dribbles and weak stream and incomplete emptying.  No injuries.    Exam:   General:  Alert, interactive  Cardiovascular:  Well perfused  Lungs:  No respiratory distress, no accessory muscle use  Neuro:  Moving all 4 extremities  Skin:  Warm, dry  Psych:  Normal affect  Abd: Soft nondistended no guarding or rebound.  Moderate right lower quadrant and right groin tenderness to palpation.    Plan of Care:   I evaluated the patient and developed an initial plan of care. I discussed this plan and explained that I, or one of my partners, would be returning to complete the evaluation.       5/2/2023  [facility] [employer]    Portions of this medical record were completed by a scribe. UPON MY REVIEW AND AUTHENTICATION BY ELECTRONIC SIGNATURE, this confirms (a) I performed the applicable clinical services, and (b) the record is accurate.        Joselo Parnell MD  05/02/23 0057

## 2023-05-02 NOTE — ED TRIAGE NOTES
Intermittent right sided groin pain for past couple of months.  Today was so bad pt was unable to stand.     Triage Assessment     Row Name 05/02/23 2312       Triage Assessment (Adult)    Airway WDL WDL       Respiratory WDL    Respiratory WDL WDL       Skin Circulation/Temperature WDL    Skin Circulation/Temperature WDL WDL       Cardiac WDL    Cardiac WDL WDL       Peripheral/Neurovascular WDL    Peripheral Neurovascular WDL WDL       Cognitive/Neuro/Behavioral WDL    Cognitive/Neuro/Behavioral WDL WDL

## 2023-05-11 ENCOUNTER — OFFICE VISIT (OUTPATIENT)
Dept: SURGERY | Facility: CLINIC | Age: 73
End: 2023-05-11
Payer: COMMERCIAL

## 2023-05-11 VITALS
HEIGHT: 70 IN | DIASTOLIC BLOOD PRESSURE: 64 MMHG | BODY MASS INDEX: 30.35 KG/M2 | WEIGHT: 212 LBS | RESPIRATION RATE: 16 BRPM | HEART RATE: 82 BPM | OXYGEN SATURATION: 96 % | SYSTOLIC BLOOD PRESSURE: 110 MMHG

## 2023-05-11 DIAGNOSIS — K40.90 RIGHT INGUINAL HERNIA: Primary | ICD-10-CM

## 2023-05-11 PROCEDURE — 99203 OFFICE O/P NEW LOW 30 MIN: CPT | Performed by: SURGERY

## 2023-05-11 RX ORDER — CELECOXIB 100 MG/1
100 CAPSULE ORAL
COMMUNITY
Start: 2023-02-15

## 2023-05-11 RX ORDER — SODIUM FLUORIDE 5 MG/G
GEL, DENTIFRICE DENTAL 2 TIMES DAILY
COMMUNITY
Start: 2022-03-07

## 2023-05-11 RX ORDER — METOPROLOL TARTRATE 25 MG/1
25 TABLET, FILM COATED ORAL 2 TIMES DAILY
COMMUNITY
Start: 2023-02-20

## 2023-05-11 RX ORDER — ATORVASTATIN CALCIUM 80 MG/1
80 TABLET, FILM COATED ORAL
COMMUNITY
Start: 2023-02-15

## 2023-05-11 RX ORDER — NAPROXEN 500 MG/1
TABLET ORAL
COMMUNITY
Start: 2022-05-20 | End: 2023-06-01

## 2023-05-11 NOTE — PROGRESS NOTES
"[facility]  Surgery Consultation    Primary care provider:  Deion Shah [phone]  Consultation requested by: Joselo Parnell MD    HPI: Patient is a 72-year-old gentleman referred by the above-mentioned ER physician for evaluation of right inguinal hernia.  He has a prior history of robotic assisted laparoscopic left inguinal hernia repair at a separate institution.  He presented to our emergency department recently with significant right lower quadrant groin pain.  He was evaluated and found to have a fat-containing inguinal hernia.  He reports that he has had this for some time.  It increases in discomfort particularly prior to bowel movements or after a large meal.  He does have some chronic issues with urinary urgency and sounds like overflow incontinence.  Also has issues with chronic obstipation and constipation intermittently.  No signs or symptoms that suggest incarceration or strangulation.    PMH:   has a past medical history of Hypertension.  PSH:    has a past surgical history that includes orthopedic surgery and Esophagoscopy, gastroscopy, duodenoscopy (EGD), combined (N/A, 10/7/2015).  Social History:   reports that he quit smoking about 13 years ago. His smoking use included cigarettes. He does not have any smokeless tobacco history on file. He reports current alcohol use.  Family History:  family history is not on file.  Medications/Allergies: Home medications and allergies reviewed.    ROS:  The 10 point Review of Systems is negative other than noted in the HPI.    Physical Exam:  /64   Pulse 82   Resp 16   Ht 1.778 m (5' 10\")   Wt 96.2 kg (212 lb)   SpO2 96%   BMI 30.42 kg/m    GENERAL: Generally appears well.  Psych: Alert and Oriented.  Normal affect  Eyes: Sclera clear  Respiratory:  Lungs clear to ausculation bilaterally with good air excursion  Cardiovascular:  Regular Rate and Rhythm with no murmurs gallops or rubs, normal peripheral pulses  GI: Abdomen " Non Distended Soft Non-Tender  No hernias palpated..  Groin- I examined the patient in both the standing and supine positions. Right Groin- Small inguinal hernia.  Hernia was easily reduciable. Left Groin- No hernia Palpated. No scrotal or testicle abnormalities.  Lymphatic/Hematologic/Immune:  No femoral or cervical lymphadenopathy.  Integumentary:  No rashes  Neurological: grossly intact     All new lab and imaging data was reviewed.     Impression and Plan:  Patient is a 72 year old male with right inguinal hernia    PLAN: Recommend robotic assisted laparoscopic repair at his convenience  I discussed the pathophysiology of hernias and options for repair including laparoscopic VS open.  The risks associated with the procedure including, but not limited to, recurrence, nerve entrapment or injury, persistence of pain, injury to the bowel/bladder, infertility, hematoma, mesh migration, mesh infection, MI, and PE were discussed with the patient. He indicated understanding of the discussion, asked appropriate questions, and provided consent. Signs and symptoms of incarceration were discussed. If these develop in the interim, he promises to call or go straight to the ER. I have provided the patient with an information pamphlet.      Thank you very much for this consult.    Nicolás Muñoz M.D.  [facility]  [phone]    Please route or send letter to:  Primary Care Provider (PCP) and Referring Provider

## 2023-05-15 ENCOUNTER — TELEPHONE (OUTPATIENT)
Dept: SURGERY | Facility: CLINIC | Age: 73
End: 2023-05-15
Payer: COMMERCIAL

## 2023-05-15 NOTE — TELEPHONE ENCOUNTER
Type of surgery: Robotic assisted laparoscopic right inguinal hernia repair  Location of surgery: [facility]  Date and time of surgery: 6/2/23 at 10am  Surgeon: Dr. Nicolás Muñoz  Pre-Op Appt Date: patient to schedule  Post-Op Appt Date: patient to schedule   Packet sent out: Yes  Pre-cert/Authorization completed:  Not Applicable  Date: 5/15/23

## 2023-05-17 NOTE — H&P (VIEW-ONLY)
[facility]      Preoperative Consultation   Beni Chau   : 1950   Gender: male    Date of Encounter: 2023    Nursing Notes:   Rona Raya  2023  3:02 PM  Signed  Patient is here today for a Preop exam.  DOS is 2023 @ [facility].  Patient is going to have Robotic assisted laparoscopic right ingunial hernia , with Dr. Nicolás Muñoz MD .        Fax: [phone]        Rona Raya ....................  2023   2:55 PM              History of Present Illness   Beni Chau is a 72 y.o. male here for pre-operative evaluation for right inguinal hernia repair.  He felt a tear on 23.  He was taken by EMS to the emergency department and found to have a significant tear.  CT showed Small cholelithiasis, Colonic Diverticula without diverticulitis and small right inguinal hernia with out incarceration.      Chest pressure or pain at rest?  No  Have you had pain radiating into your chin, shoulder or stomach? No  Any nausea with activity? No  Chest pressure or pain with walking up two flights of stairs? No  Can you walk four blocks without needing to stop to catch your breath? yes  If not, how far can you walk before resting?  NA  Any pains in your legs, especially lower legs, with walking on flat ground for 4 blocks? No  If not, how far can you walk before you need to rest your legs? NA  Any recent cough or cold symptoms? No  Do you ever need supplemental oxygen? No  Any needing to sleep propped up or sitting up? No  Do you wake in the night short of breath? No  Any recent fever or chills? No  Any nausea or vomiting? No  Any other recent illness? No       Review of Systems   Denies fevers, chills, headaches, visual changes, fatigue, myalgias, nasal congestion, rhinorrhea,ear pain or discharge, sore throat, swollen glands, abdominal pain, cough, shortness of breath, chest pain, weight change, change in bowel habits, melena, rectal bleeding, dysuria, polyuria, hematuria,  polyphagia, joint pain or swelling or erythema, edema, rash, weakness, paresthesias, or mood changes     Patient Active Problem List   Diagnosis Code     Pulmonary nodule seen on imaging study R91.1     Hypertrophy of prostate without urinary obstruction and other lower urinary tract symptoms (LUTS) N40.0     Anxiety state, unspecified F41.1     Pure hypercholesterolemia E78.00     Nephrolithiasis N20.0     HTN (hypertension) I10     Lattice degeneration of peripheral retina H35.419     SNHL (sensorineural hearing loss) H90.5     Vitamin D deficiency E55.9     ACP (advance care planning) Z71.89     History of placement of stent in LAD coronary artery Z95.5     Pre-diabetes R73.03     Gastrointestinal hemorrhage associated with gastric ulcer K25.4     MUHAMMAD (dyspnea on exertion) R06.09     Inguinal hernia, incarcerated K40.30     Screening for colon cancer - next colonoscopy is in 2022 Z12.11     Pain in both hands M79.641, M79.642     PSVT (paroxysmal supraventricular tachycardia) (HC) I47.1     Chronic bronchitis (HC) J42     COVID-19 virus infection U07.1     Current Outpatient Medications   Medication Sig     aspirin chewable 81 mg chewable tablet Take 81 mg by mouth once daily with a meal.     atorvastatin (LIPITOR) 80 mg tablet Take 1 Tablet (80 mg) by mouth once daily.     celecoxib (CELEBREX) 100 mg capsule Take 1 Capsule (100 mg) by mouth two times daily with meals. For pain management     cholecalciferol (VITAMIN D3) 5,000 unit capsule Take 5,000 Units by mouth once daily.     diclofenac topical (VOLTAREN) 1 % gel Apply 2 g topically to affected area(s) 4 times daily.     Garlic 1,000 mg cap Takes 1 capsule daily for heart health     loratadine (CLARITIN) 10 mg tablet Take 1 tablet by mouth once daily if needed.     metoprolol tartrate (LOPRESSOR) 25 mg tablet Take 1 Tablet (25 mg) by mouth two times daily.     nitroglycerin (NITROSTAT) 0.4 mg sublingual tablet Place 1 tablet under the tongue every 5 minutes  if needed for Chest Pain (For chest pain x 3 doses.).     Sodium Fluoride 5000 Plus 1.1 % crea BRUSH WITH 2-3 TIMES DAILY. BRUSH FOR 2 MINUTES. NO FOOD OR WATER FOR 30 MINUTES AFTER     traMADoL (ULTRAM) 50 mg tablet Take by mouth every 4 hours if needed.     vitamin B complex-folic acid (B COMPLEX 1, WITH FOLIC ACID,) 0.4 mg tab tablet Take 1 tablet by mouth once daily. For immune support     vitamin e 400 unit capsule Take 1 capsule by mouth once daily. For heart health     No current facility-administered medications for this visit.     Medications have been reviewed by me and are current to the best of my knowledge and ability.     Allergies   Allergen Reactions     Naproxen Rash and Syncope     Incontinence  ? syncope     Oxycodone Nausea And Vomiting     Tamsulosin      FLOMAX; Near syncope       Past Surgical History:   . Laterality Date     COLONOSCOPY SCREENING  2012    normal with h/o polyps; 5 yr recall     COLONOSCOPY W/ BIOPSIES  2017    Repeat colonoscopy in 5 years.     CVL CORONARY ANGIOGRAM  1/9/15, 16     HERNIA REPAIR Bilateral 2017     INGUINAL HERNIA REPAIR  03/10/2017     AZ BONE SPUR HEEL PADM-PACT BC [id number]      right @ 47 yo     AZ SUBTALAR ATHROEREISIS  late     right foot neuroma x3     AZ SUBTALAR ATHROEREISIS  early     left x1     ROTATOR CUFF REPAIR  3/18/2012     SHOULDER SURGERY Bilateral     surgery on both sides, left is good, arthorscopic.  right has incision,      VASECTOMY      early 20s     Social History     Tobacco Use     Smoking status: Former     Current packs/day: 0.00     Average packs/day: 1.5 packs/day for 36.1 years (54.2 pk-yrs)     Types: Cigarettes     Start date: 1973     Quit date: 10/12/2009     Years since quittin.6     Smokeless tobacco: Never     Tobacco comments:     Quit 10/10/09   Vaping Use     Vaping Use: Never used   Substance Use Topics     Alcohol use: Yes     Alcohol/week: 0.0 - 24.0 standard drinks of alcohol  "    Drug use: No     Family History   Problem Relation Age of Onset     Other Mother         dementia     Heart Disease Father         MI @ 70 yo     Hypertension Father      Heart attack Brother          at 65 from MI      Hypertension Brother      Liver cancer Brother      Cancer-breast No Family History      Cancer-colon No Family History      Cancer-ovarian No Family History      Cancer-pancreatic No Family History      Cancer-prostate No Family History      Melanoma No Family History        PAST DIFFICULTY WITH ANESTHESIA: Yes, personal hx of waking up mid procedure.       Physical Exam   /70 (Cuff Site: Right Arm, Position: Sitting, Cuff Size: Adult Large)   Pulse 83   Ht 1.778 m (5' 10\")   Wt 98.2 kg (216 lb 8 oz)   SpO2 97%   BMI 31.06 kg/m   Body mass index is 31.06 kg/m .  Gen:  Pleasant male in no apparent distress sitting comfortably  HEENT:  Normocephalic atraumatic,  TMs intact, pinkish grey, with normal light reflex  Neck:  Thyroid nontender, nonnodular, not enlarged.  No cervical LAD.    Chest is clear, no wheezing or rales. Normal symmetric air entry throughout both lung fields. No chest wall deformities or tenderness.  CV:  regular rate and rhythm, no murmurs, rubs, or gallops, normal S1 S2.   Abd:  soft, nontender, nondistended, no rebound or gaurding.  No masses or hepatosplenomegaly.  Normal bowel sounds.  No CVA tenderness  Ext:  Distal pulses 2+ bilaterally, no edema.   No cyanosis or clubbing.  Full ROM without pain.    NEURO:  Alert & Oriented x3.  Sensation intact.  Normal Coordination.  CN II - XII grossly intact.  5/5 strength in upper & lower extremities.    Skin:  No rashes, areas of erythema, excoriation or ecchymosis       Assessment / Plan   1. Preop examination  2. Inguinal hernia, incarcerated  No contraindications to planned procedure based on today's exam.  Ok to proceed with planned procedure based on today's exam.  Reviewed no NSAIDS/ASA preop, npo preop, if new " sx preop pt needs to be seen.  The patient indicates understanding of these issues and agrees with the plan.    3. History of placement of stent in LAD coronary artery  4. HTN (hypertension)  5. Pure hypercholesterolemia  Well controlled  Continue to monitor   Take metoprolol day of surgery.     6. Chronic bronchitis, unspecified chronic bronchitis type (HC)  7. MUHAMMAD (dyspnea on exertion)  No significant change.   Continue to monitor   Outpatient Current Pulmonary Medications as of 5/17/2023             loratadine (CLARITIN) 10 mg tablet Take 1 tablet by mouth once daily if needed.          8. Pulmonary nodule seen on imaging study  Due in march of this year.  Will reschedule.      9. Pre-diabetes  Rechecking a1c today.     10. Pain in both hands  Ok for refill.    - celecoxib (CELEBREX) 100 mg capsule; Take 1 Capsule (100 mg) by mouth two times daily with meals. For pain management  Dispense: 30 Capsule; Refill: 3       The Pre-Op Tool    Recommendations      Low Risk Procedure    Cardiac History  No history of coronary artery disease    COVID-19  COVID-19 > 7 weeks ago, now asymptomatic: proceed with surgery as planned.           Labs  No routine labs indicated  EKG  Not indicated  Stress Testing  Not indicated    * Testing recommendations are intended to assist, but not direct, clinical decisions.           Continue taking Metoprolol (Lopressor, Toprol); be sure to take the evening before and/or morning of the procedure.  Continue taking Celecoxib (Celebrex) through procedure (it does not 'thin' the blood).  Hold vitamins and/or supplements for 1 week prior to the procedure  Okay to take Acetaminophen (Tylenol) up until the procedure  Hold / avoid NSAIDs (e.g. ibuprofen, naproxen) prior to procedure: 2 days for ibuprofen (Advil) and 4 days for naproxen (Aleve).    * Medication recommendations are not intended to be exhaustive; they are limited to common medications that are potentially dangerous if incorrectly  managed          Labs  * Data supports elimination of  routine  laboratory testing in favor of focused,  indicated  testing based on medical co-morbidities. A 2009 study randomized 1061 patients undergoing ambulatory, non-cataract surgery to routine or to indicated testing. Perioperative adverse events were similar (Anesthesia & Analgesia 2009;108:467-75; Anesthesiol. Clin. 2016 Mar;34(1):43-58).  EKG  * The ACC/AHA recommends against obtaining routine EKGs in patients undergoing low risk surgeries, a class IIa recommendation (JACC. 2014;64(21);e1-76).     Session ID: [id number]  Endnotes and bibliography available upon request: [email or web address]    Labs: pending  ECG: no  No diagnosis found.  Patient is cleared, pending tests ordered today, for planned procedure.   Electronically Signed by:   Deion Shah M.D.  3:16 PM 5/17/2023 5/17/2023

## 2023-05-22 ENCOUNTER — VIRTUAL VISIT (OUTPATIENT)
Dept: SURGERY | Facility: CLINIC | Age: 73
End: 2023-05-22
Payer: COMMERCIAL

## 2023-05-22 DIAGNOSIS — K40.90 RIGHT INGUINAL HERNIA: Primary | ICD-10-CM

## 2023-05-22 NOTE — PROGRESS NOTES
Follow-up phone call with patient following recent office consultation.  Office visit on May 11 for consultation regarding possible right inguinal hernia.  Patient had had recent episode of significant discomfort and pain prompting ER visit whereby a fat-containing right inguinal hernia was identified on imaging.  Patient has since been scheduled for robotic assisted laparoscopic right inguinal hernia repair.  He was noted incidentally to have gallstones on his imaging when he was in the ER.  Calls back today to discuss the possibility of proceeding with concurrent cholecystectomy.    I had a lengthy discussion with him.  He presently has no signs or symptoms of biliary colic or symptomatic gallstones.  I therefore at this time would recommend deferring cholecystectomy.  He expressed understanding of the conversation.  We will proceed with hernia repair as scheduled.  Total time spent was 20 minutes with greater than 50% in direct conversation over the telephone.  Also included chart and imaging review.

## 2023-06-01 RX ORDER — LORATADINE 10 MG/1
10 TABLET ORAL DAILY PRN
COMMUNITY

## 2023-06-01 RX ORDER — CYANOCOBALAMIN (VITAMIN B-12) 500 MCG
400 LOZENGE ORAL DAILY
COMMUNITY

## 2023-06-02 ENCOUNTER — ANESTHESIA (OUTPATIENT)
Dept: SURGERY | Facility: CLINIC | Age: 73
End: 2023-06-02
Payer: COMMERCIAL

## 2023-06-02 ENCOUNTER — APPOINTMENT (OUTPATIENT)
Dept: SURGERY | Facility: PHYSICIAN GROUP | Age: 73
End: 2023-06-02
Payer: COMMERCIAL

## 2023-06-02 ENCOUNTER — ANESTHESIA EVENT (OUTPATIENT)
Dept: SURGERY | Facility: CLINIC | Age: 73
End: 2023-06-02
Payer: COMMERCIAL

## 2023-06-02 ENCOUNTER — HOSPITAL ENCOUNTER (OUTPATIENT)
Facility: CLINIC | Age: 73
Discharge: HOME | End: 2023-06-03
Attending: SURGERY | Admitting: SURGERY
Payer: COMMERCIAL

## 2023-06-02 DIAGNOSIS — K40.90 RIGHT INGUINAL HERNIA: Primary | ICD-10-CM

## 2023-06-02 PROCEDURE — C1781 MESH (IMPLANTABLE): HCPCS | Performed by: SURGERY

## 2023-06-02 PROCEDURE — 250N000011 HC RX IP 250 OP 636: Performed by: ANESTHESIOLOGY

## 2023-06-02 PROCEDURE — 710N000009 HC RECOVERY PHASE 1, LEVEL 1, PER MIN: Performed by: SURGERY

## 2023-06-02 PROCEDURE — 250N000011 HC RX IP 250 OP 636: Performed by: PHYSICIAN ASSISTANT

## 2023-06-02 PROCEDURE — 250N000013 HC RX MED GY IP 250 OP 250 PS 637: Performed by: PHYSICIAN ASSISTANT

## 2023-06-02 PROCEDURE — S2900 ROBOTIC SURGICAL SYSTEM: HCPCS | Performed by: SURGERY

## 2023-06-02 PROCEDURE — 250N000009 HC RX 250: Performed by: ANESTHESIOLOGY

## 2023-06-02 PROCEDURE — 250N000009 HC RX 250: Performed by: SURGERY

## 2023-06-02 PROCEDURE — 272N000001 HC OR GENERAL SUPPLY STERILE: Performed by: SURGERY

## 2023-06-02 PROCEDURE — 370N000017 HC ANESTHESIA TECHNICAL FEE, PER MIN: Performed by: SURGERY

## 2023-06-02 PROCEDURE — 250N000025 HC SEVOFLURANE, PER MIN: Performed by: SURGERY

## 2023-06-02 PROCEDURE — 999N000141 HC STATISTIC PRE-PROCEDURE NURSING ASSESSMENT: Performed by: SURGERY

## 2023-06-02 PROCEDURE — 49650 LAP ING HERNIA REPAIR INIT: CPT | Mod: AS | Performed by: PHYSICIAN ASSISTANT

## 2023-06-02 PROCEDURE — 250N000011 HC RX IP 250 OP 636: Performed by: SURGERY

## 2023-06-02 PROCEDURE — 258N000003 HC RX IP 258 OP 636: Performed by: ANESTHESIOLOGY

## 2023-06-02 PROCEDURE — 360N000080 HC SURGERY LEVEL 7, PER MIN: Performed by: SURGERY

## 2023-06-02 PROCEDURE — 258N000003 HC RX IP 258 OP 636: Performed by: PHYSICIAN ASSISTANT

## 2023-06-02 PROCEDURE — 49650 LAP ING HERNIA REPAIR INIT: CPT | Mod: RT | Performed by: SURGERY

## 2023-06-02 DEVICE — MESH PROGRIP LAPAROSCOPIC 5.9X3.9" PARIETEX SELF-FIX LPG1510: Type: IMPLANTABLE DEVICE | Site: ABDOMEN | Status: FUNCTIONAL

## 2023-06-02 RX ORDER — NALOXONE HYDROCHLORIDE 0.4 MG/ML
0.4 INJECTION, SOLUTION INTRAMUSCULAR; INTRAVENOUS; SUBCUTANEOUS
Status: DISCONTINUED | OUTPATIENT
Start: 2023-06-02 | End: 2023-06-03 | Stop reason: HOSPADM

## 2023-06-02 RX ORDER — ALBUTEROL SULFATE 0.83 MG/ML
2.5 SOLUTION RESPIRATORY (INHALATION) EVERY 4 HOURS PRN
Status: DISCONTINUED | OUTPATIENT
Start: 2023-06-02 | End: 2023-06-02 | Stop reason: HOSPADM

## 2023-06-02 RX ORDER — PROCHLORPERAZINE MALEATE 5 MG
5 TABLET ORAL EVERY 6 HOURS PRN
Status: DISCONTINUED | OUTPATIENT
Start: 2023-06-02 | End: 2023-06-03 | Stop reason: HOSPADM

## 2023-06-02 RX ORDER — ONDANSETRON 4 MG/1
4 TABLET, ORALLY DISINTEGRATING ORAL EVERY 6 HOURS PRN
Status: DISCONTINUED | OUTPATIENT
Start: 2023-06-02 | End: 2023-06-03 | Stop reason: HOSPADM

## 2023-06-02 RX ORDER — FENTANYL CITRATE 50 UG/ML
INJECTION, SOLUTION INTRAMUSCULAR; INTRAVENOUS PRN
Status: DISCONTINUED | OUTPATIENT
Start: 2023-06-02 | End: 2023-06-02

## 2023-06-02 RX ORDER — FENTANYL CITRATE 50 UG/ML
25 INJECTION, SOLUTION INTRAMUSCULAR; INTRAVENOUS EVERY 5 MIN PRN
Status: DISCONTINUED | OUTPATIENT
Start: 2023-06-02 | End: 2023-06-02 | Stop reason: HOSPADM

## 2023-06-02 RX ORDER — NALOXONE HYDROCHLORIDE 0.4 MG/ML
0.2 INJECTION, SOLUTION INTRAMUSCULAR; INTRAVENOUS; SUBCUTANEOUS
Status: DISCONTINUED | OUTPATIENT
Start: 2023-06-02 | End: 2023-06-03 | Stop reason: HOSPADM

## 2023-06-02 RX ORDER — FENTANYL CITRATE 50 UG/ML
25 INJECTION, SOLUTION INTRAMUSCULAR; INTRAVENOUS
Status: DISCONTINUED | OUTPATIENT
Start: 2023-06-02 | End: 2023-06-02 | Stop reason: HOSPADM

## 2023-06-02 RX ORDER — BUPIVACAINE HYDROCHLORIDE AND EPINEPHRINE 5; 5 MG/ML; UG/ML
INJECTION, SOLUTION PERINEURAL PRN
Status: DISCONTINUED | OUTPATIENT
Start: 2023-06-02 | End: 2023-06-02 | Stop reason: HOSPADM

## 2023-06-02 RX ORDER — HYDROMORPHONE HCL IN WATER/PF 6 MG/30 ML
0.2 PATIENT CONTROLLED ANALGESIA SYRINGE INTRAVENOUS
Status: DISCONTINUED | OUTPATIENT
Start: 2023-06-02 | End: 2023-06-03

## 2023-06-02 RX ORDER — SODIUM CHLORIDE, SODIUM LACTATE, POTASSIUM CHLORIDE, CALCIUM CHLORIDE 600; 310; 30; 20 MG/100ML; MG/100ML; MG/100ML; MG/100ML
INJECTION, SOLUTION INTRAVENOUS CONTINUOUS
Status: DISCONTINUED | OUTPATIENT
Start: 2023-06-02 | End: 2023-06-02 | Stop reason: HOSPADM

## 2023-06-02 RX ORDER — HYDROCODONE BITARTRATE AND ACETAMINOPHEN 5; 325 MG/1; MG/1
1 TABLET ORAL
Status: DISCONTINUED | OUTPATIENT
Start: 2023-06-02 | End: 2023-06-02

## 2023-06-02 RX ORDER — LORATADINE 10 MG/1
10 TABLET ORAL DAILY PRN
Status: DISCONTINUED | OUTPATIENT
Start: 2023-06-02 | End: 2023-06-03 | Stop reason: HOSPADM

## 2023-06-02 RX ORDER — LABETALOL HYDROCHLORIDE 5 MG/ML
10 INJECTION, SOLUTION INTRAVENOUS
Status: DISCONTINUED | OUTPATIENT
Start: 2023-06-02 | End: 2023-06-02 | Stop reason: HOSPADM

## 2023-06-02 RX ORDER — HYDROMORPHONE HCL IN WATER/PF 6 MG/30 ML
0.4 PATIENT CONTROLLED ANALGESIA SYRINGE INTRAVENOUS
Status: DISCONTINUED | OUTPATIENT
Start: 2023-06-02 | End: 2023-06-03

## 2023-06-02 RX ORDER — GLYCOPYRROLATE 0.2 MG/ML
INJECTION, SOLUTION INTRAMUSCULAR; INTRAVENOUS PRN
Status: DISCONTINUED | OUTPATIENT
Start: 2023-06-02 | End: 2023-06-02

## 2023-06-02 RX ORDER — ONDANSETRON 4 MG/1
4 TABLET, ORALLY DISINTEGRATING ORAL EVERY 30 MIN PRN
Status: DISCONTINUED | OUTPATIENT
Start: 2023-06-02 | End: 2023-06-02 | Stop reason: HOSPADM

## 2023-06-02 RX ORDER — ONDANSETRON 2 MG/ML
4 INJECTION INTRAMUSCULAR; INTRAVENOUS EVERY 30 MIN PRN
Status: DISCONTINUED | OUTPATIENT
Start: 2023-06-02 | End: 2023-06-02 | Stop reason: HOSPADM

## 2023-06-02 RX ORDER — CEFAZOLIN SODIUM/WATER 2 G/20 ML
2 SYRINGE (ML) INTRAVENOUS SEE ADMIN INSTRUCTIONS
Status: DISCONTINUED | OUTPATIENT
Start: 2023-06-02 | End: 2023-06-02 | Stop reason: HOSPADM

## 2023-06-02 RX ORDER — SODIUM CHLORIDE, SODIUM LACTATE, POTASSIUM CHLORIDE, CALCIUM CHLORIDE 600; 310; 30; 20 MG/100ML; MG/100ML; MG/100ML; MG/100ML
INJECTION, SOLUTION INTRAVENOUS CONTINUOUS
Status: DISCONTINUED | OUTPATIENT
Start: 2023-06-02 | End: 2023-06-03

## 2023-06-02 RX ORDER — ASPIRIN 81 MG/1
81 TABLET ORAL DAILY
Status: DISCONTINUED | OUTPATIENT
Start: 2023-06-03 | End: 2023-06-03 | Stop reason: HOSPADM

## 2023-06-02 RX ORDER — HYDROCODONE BITARTRATE AND ACETAMINOPHEN 5; 325 MG/1; MG/1
1-2 TABLET ORAL EVERY 6 HOURS PRN
Status: DISCONTINUED | OUTPATIENT
Start: 2023-06-02 | End: 2023-06-03 | Stop reason: HOSPADM

## 2023-06-02 RX ORDER — HYDROMORPHONE HCL IN WATER/PF 6 MG/30 ML
0.4 PATIENT CONTROLLED ANALGESIA SYRINGE INTRAVENOUS EVERY 5 MIN PRN
Status: DISCONTINUED | OUTPATIENT
Start: 2023-06-02 | End: 2023-06-02 | Stop reason: HOSPADM

## 2023-06-02 RX ORDER — PROPOFOL 10 MG/ML
INJECTION, EMULSION INTRAVENOUS CONTINUOUS PRN
Status: DISCONTINUED | OUTPATIENT
Start: 2023-06-02 | End: 2023-06-02

## 2023-06-02 RX ORDER — LIDOCAINE 40 MG/G
CREAM TOPICAL
Status: DISCONTINUED | OUTPATIENT
Start: 2023-06-02 | End: 2023-06-03 | Stop reason: HOSPADM

## 2023-06-02 RX ORDER — AMOXICILLIN 250 MG
1-2 CAPSULE ORAL 2 TIMES DAILY PRN
Qty: 12 TABLET | Refills: 0 | Status: SHIPPED | OUTPATIENT
Start: 2023-06-02

## 2023-06-02 RX ORDER — OXYCODONE HYDROCHLORIDE 5 MG/1
5 TABLET ORAL
Status: DISCONTINUED | OUTPATIENT
Start: 2023-06-02 | End: 2023-06-02 | Stop reason: HOSPADM

## 2023-06-02 RX ORDER — CEFAZOLIN SODIUM/WATER 2 G/20 ML
2 SYRINGE (ML) INTRAVENOUS
Status: COMPLETED | OUTPATIENT
Start: 2023-06-02 | End: 2023-06-02

## 2023-06-02 RX ORDER — ONDANSETRON 2 MG/ML
4 INJECTION INTRAMUSCULAR; INTRAVENOUS EVERY 6 HOURS PRN
Status: DISCONTINUED | OUTPATIENT
Start: 2023-06-02 | End: 2023-06-03 | Stop reason: HOSPADM

## 2023-06-02 RX ORDER — NEOSTIGMINE METHYLSULFATE 1 MG/ML
VIAL (ML) INJECTION PRN
Status: DISCONTINUED | OUTPATIENT
Start: 2023-06-02 | End: 2023-06-02

## 2023-06-02 RX ORDER — HYDROCODONE BITARTRATE AND ACETAMINOPHEN 5; 325 MG/1; MG/1
1-2 TABLET ORAL EVERY 4 HOURS PRN
Qty: 20 TABLET | Refills: 0 | Status: SHIPPED | OUTPATIENT
Start: 2023-06-02

## 2023-06-02 RX ORDER — FENTANYL CITRATE 50 UG/ML
50 INJECTION, SOLUTION INTRAMUSCULAR; INTRAVENOUS EVERY 5 MIN PRN
Status: DISCONTINUED | OUTPATIENT
Start: 2023-06-02 | End: 2023-06-02 | Stop reason: HOSPADM

## 2023-06-02 RX ORDER — SODIUM CHLORIDE, SODIUM LACTATE, POTASSIUM CHLORIDE, CALCIUM CHLORIDE 600; 310; 30; 20 MG/100ML; MG/100ML; MG/100ML; MG/100ML
INJECTION, SOLUTION INTRAVENOUS CONTINUOUS PRN
Status: DISCONTINUED | OUTPATIENT
Start: 2023-06-02 | End: 2023-06-02

## 2023-06-02 RX ORDER — ATORVASTATIN CALCIUM 40 MG/1
80 TABLET, FILM COATED ORAL DAILY
Status: DISCONTINUED | OUTPATIENT
Start: 2023-06-02 | End: 2023-06-03 | Stop reason: HOSPADM

## 2023-06-02 RX ORDER — PROPOFOL 10 MG/ML
INJECTION, EMULSION INTRAVENOUS PRN
Status: DISCONTINUED | OUTPATIENT
Start: 2023-06-02 | End: 2023-06-02

## 2023-06-02 RX ORDER — LIDOCAINE HYDROCHLORIDE 20 MG/ML
INJECTION, SOLUTION INFILTRATION; PERINEURAL PRN
Status: DISCONTINUED | OUTPATIENT
Start: 2023-06-02 | End: 2023-06-02

## 2023-06-02 RX ORDER — HYDRALAZINE HYDROCHLORIDE 20 MG/ML
2.5-5 INJECTION INTRAMUSCULAR; INTRAVENOUS EVERY 10 MIN PRN
Status: DISCONTINUED | OUTPATIENT
Start: 2023-06-02 | End: 2023-06-02 | Stop reason: HOSPADM

## 2023-06-02 RX ORDER — MEPERIDINE HYDROCHLORIDE 25 MG/ML
12.5 INJECTION INTRAMUSCULAR; INTRAVENOUS; SUBCUTANEOUS EVERY 5 MIN PRN
Status: DISCONTINUED | OUTPATIENT
Start: 2023-06-02 | End: 2023-06-02 | Stop reason: HOSPADM

## 2023-06-02 RX ORDER — HYDROMORPHONE HCL IN WATER/PF 6 MG/30 ML
0.2 PATIENT CONTROLLED ANALGESIA SYRINGE INTRAVENOUS EVERY 5 MIN PRN
Status: DISCONTINUED | OUTPATIENT
Start: 2023-06-02 | End: 2023-06-02 | Stop reason: HOSPADM

## 2023-06-02 RX ORDER — OXYCODONE HYDROCHLORIDE 5 MG/1
10 TABLET ORAL
Status: DISCONTINUED | OUTPATIENT
Start: 2023-06-02 | End: 2023-06-02 | Stop reason: HOSPADM

## 2023-06-02 RX ORDER — METOPROLOL TARTRATE 25 MG/1
25 TABLET, FILM COATED ORAL 2 TIMES DAILY
Status: DISCONTINUED | OUTPATIENT
Start: 2023-06-02 | End: 2023-06-03 | Stop reason: HOSPADM

## 2023-06-02 RX ORDER — CELECOXIB 100 MG/1
100 CAPSULE ORAL EVERY EVENING
Status: DISCONTINUED | OUTPATIENT
Start: 2023-06-02 | End: 2023-06-03 | Stop reason: HOSPADM

## 2023-06-02 RX ADMIN — PROPOFOL 30 MCG/KG/MIN: 10 INJECTION, EMULSION INTRAVENOUS at 09:37

## 2023-06-02 RX ADMIN — SODIUM CHLORIDE, POTASSIUM CHLORIDE, SODIUM LACTATE AND CALCIUM CHLORIDE: 600; 310; 30; 20 INJECTION, SOLUTION INTRAVENOUS at 12:06

## 2023-06-02 RX ADMIN — NEOSTIGMINE METHYLSULFATE 5 MG: 1 INJECTION, SOLUTION INTRAVENOUS at 10:35

## 2023-06-02 RX ADMIN — Medication 2 G: at 09:35

## 2023-06-02 RX ADMIN — HYDROMORPHONE HYDROCHLORIDE 0.5 MG: 1 INJECTION, SOLUTION INTRAMUSCULAR; INTRAVENOUS; SUBCUTANEOUS at 10:44

## 2023-06-02 RX ADMIN — HYDROCODONE BITARTRATE AND ACETAMINOPHEN 2 TABLET: 5; 325 TABLET ORAL at 14:50

## 2023-06-02 RX ADMIN — GLYCOPYRROLATE 1 MG: 0.2 INJECTION, SOLUTION INTRAMUSCULAR; INTRAVENOUS at 10:35

## 2023-06-02 RX ADMIN — FENTANYL CITRATE 100 MCG: 50 INJECTION, SOLUTION INTRAMUSCULAR; INTRAVENOUS at 09:32

## 2023-06-02 RX ADMIN — ATORVASTATIN CALCIUM 80 MG: 40 TABLET, FILM COATED ORAL at 12:03

## 2023-06-02 RX ADMIN — FENTANYL CITRATE 25 MCG: 50 INJECTION, SOLUTION INTRAMUSCULAR; INTRAVENOUS at 11:24

## 2023-06-02 RX ADMIN — HYDROMORPHONE HYDROCHLORIDE 0.2 MG: 0.2 INJECTION, SOLUTION INTRAMUSCULAR; INTRAVENOUS; SUBCUTANEOUS at 14:49

## 2023-06-02 RX ADMIN — ROCURONIUM BROMIDE 50 MG: 50 INJECTION, SOLUTION INTRAVENOUS at 09:32

## 2023-06-02 RX ADMIN — FENTANYL CITRATE 25 MCG: 50 INJECTION, SOLUTION INTRAMUSCULAR; INTRAVENOUS at 11:01

## 2023-06-02 RX ADMIN — PHENYLEPHRINE HYDROCHLORIDE 100 MCG: 10 INJECTION INTRAVENOUS at 09:42

## 2023-06-02 RX ADMIN — PROPOFOL 20 MG: 10 INJECTION, EMULSION INTRAVENOUS at 10:01

## 2023-06-02 RX ADMIN — FENTANYL CITRATE 25 MCG: 50 INJECTION, SOLUTION INTRAMUSCULAR; INTRAVENOUS at 11:09

## 2023-06-02 RX ADMIN — LIDOCAINE HYDROCHLORIDE 100 MG: 20 INJECTION, SOLUTION INFILTRATION; PERINEURAL at 09:32

## 2023-06-02 RX ADMIN — METOPROLOL TARTRATE 25 MG: 25 TABLET, FILM COATED ORAL at 20:01

## 2023-06-02 RX ADMIN — HYDROMORPHONE HYDROCHLORIDE 0.4 MG: 0.2 INJECTION, SOLUTION INTRAMUSCULAR; INTRAVENOUS; SUBCUTANEOUS at 12:01

## 2023-06-02 RX ADMIN — PROPOFOL 180 MG: 10 INJECTION, EMULSION INTRAVENOUS at 09:32

## 2023-06-02 RX ADMIN — CELECOXIB 100 MG: 100 CAPSULE ORAL at 20:01

## 2023-06-02 RX ADMIN — SODIUM CHLORIDE, POTASSIUM CHLORIDE, SODIUM LACTATE AND CALCIUM CHLORIDE: 600; 310; 30; 20 INJECTION, SOLUTION INTRAVENOUS at 09:29

## 2023-06-02 ASSESSMENT — ACTIVITIES OF DAILY LIVING (ADL)
ADLS_ACUITY_SCORE: 18
ADLS_ACUITY_SCORE: 35

## 2023-06-02 ASSESSMENT — ENCOUNTER SYMPTOMS: DYSRHYTHMIAS: 1

## 2023-06-02 NOTE — OR NURSING
VSS. A&O. Lap sites on abdomen CDI. Selin PO ice chips, no n/v. Pain improving with PRN pain meds. Report to gen surg RN and transferred to 2229.

## 2023-06-02 NOTE — DISCHARGE INSTRUCTIONS
Same Day Surgery Discharge Instructions for  Sedation and General Anesthesia     It's not unusual to feel dizzy, light-headed or faint for up to 24 hours after surgery or while taking pain medication.  If you have these symptoms: sit for a few minutes before standing and have someone assist you when you get up to walk or use the bathroom.    You should rest and relax for the next 24 hours. We recommend you make arrangements to have an adult stay with you for at least 24 hours after your discharge.  Avoid hazardous and strenuous activity.    DO NOT DRIVE any vehicle or operate mechanical equipment for 24 hours following the end of your surgery.  Even though you may feel normal, your reactions may be affected by the medication you have received.    Do not drink alcoholic beverages for 24 hours following surgery.     Slowly progress to your regular diet as you feel able. It's not unusual to feel nauseated and/or vomit after receiving anesthesia.  If you develop these symptoms, drink clear liquids (apple juice, ginger ale, broth, 7-up, etc. ) until you feel better.  If your nausea and vomiting persists for 24 hours, please notify your surgeon.      All narcotic pain medications, along with inactivity and anesthesia, can cause constipation. Drinking plenty of liquids and increasing fiber intake will help.    For any questions of a medical nature, call your surgeon.    Do not make important decisions for 24 hours.    If you had general anesthesia, you may have a sore throat for a couple of days related to the breathing tube used during surgery.  You may use Cepacol lozenges to help with this discomfort.  If it worsens or if you develop a fever, contact your surgeon.     If you feel your pain is not well managed with the pain medications prescribed by your surgeon, please contact your surgeon's office to let them know so they can address your concerns.         [facility]  Discharge  Instructions: Post-Operative Inguinal Hernia Repair    ACTIVITY  Take frequent, short walks and increase your activity gradually.    Avoid strenuous physical activity or heavy lifting greater than 15 lbs. for 3-4 weeks.  You may climb stairs.  You may drive without restrictions when you are not using any prescription pain medication and feel comfortable in a car.  You may return to work/school when you are comfortable without any prescription pain medication.    WOUND CARE  You may remove your outer dressing or Band-Aids and shower 48 hours after the surgery.  Pat your incisions dry and leave them open to air.  Re-apply dressing (Band-Aids or gauze/tape) as needed for comfort or drainage.  You may have steri-strips (looks like white tape) on your incision.  You may peel off the steri-strips 2 weeks after your surgery if they have not peeled off on their own.  If you have skin glue, it will peel up and fall off on its own.  Do not soak your incisions in a tub or pool for 2 weeks.   Do not apply any lotions, creams, or ointments to your incisions.  A ridge under your incisions is normal and will gradually resolve.    DIET  Start with liquids, then gradually resume your regular diet as tolerated.   Drink plenty of fluids to stay hydrated.    PAIN  Expect some tenderness and discomfort at the incision site(s).  Use the prescribed pain medication at your discretion.  Expect gradual resolution of your pain over several days.  You may take ibuprofen with food (unless you have been told not to) or acetaminophen/Tylenol instead of or in addition to your prescribed pain medication.  However, if you are taking Norco or Percocet, do not take any additional acetaminophen/Tylenol.  Do not drink alcohol or drive while you are taking pain medications.  You may apply ice to your incisions in 20 minute intervals as needed for the next 48 hours.  After that time, consider switching to heat if you prefer.    EXPECTATIONS  You may  notice air in your scrotum and/or penis after the surgery.  This is due to the gas used during the surgery.  You can expect some swelling and bruising involving the scrotum and/or penis as well as numbness.  These symptoms are expected and should gradually resolve in the next few days.  You may use ice to help with the swelling and try placing a rolled hand towel below your scrotum to help alleviate scrotal discomfort.  If you develop significant testicular or penile pain, please call our office and speak with a nurse.  Pain medications can cause constipation.  Limit use when possible.  Take an over the counter or prescribed stool softener/stimulant, such as Colace or Senna, 1-2 times a day with plenty of water.  You may take a mild over the counter laxative, such as Miralax or a suppository, as needed.  You may take 1 oz. (2 tablespoons) Milk of Magnesia the evening following surgery to encourage bowel movement.  You may discontinue these medications once you are having regular bowel movements and/or are no longer taking your narcotic pain medication.   You may have shoulder or upper back discomfort due to the gas used in surgery.  This is temporary and should resolve in 48-72 hours.  Short, frequent walks may help with this.  If you are unable to urinate for 8 hours or feel as though you are not emptying your bladder adequately, we recommend you seek care at an ER or Urgent Care facility for possible catheter placement.     FOLLOW UP  Our office will contact you in approximately 2-3 weeks to check on your progress and answer any questions you may have.  If you are doing well, you will not need to return for a follow up appointment.  If any concerns are identified over the phone, we will help you make an appointment to see a provider.   If you have not received a phone call, have any questions or concerns, or would like to be seen, please call us at [phone] and ask to speak with our nurse.  We are located at  [address]  [place] [address].    CALL OUR OFFICE -[phone] IF YOU HAVE:   Chills or fever above 101 F.  Increased redness, warmth, or drainage at your incisions.  Significant bleeding.  Pain not relieved by your pain medication or rest.  Increasing pain after the first 48 hours.  Any other concerns or questions.             Revised October 2022      If you have questions or concerns about your procedure,  call Dr. Muñoz at [phone]*  * [facility] *

## 2023-06-02 NOTE — ANESTHESIA POSTPROCEDURE EVALUATION
Patient: Beni Chau    Procedure: Procedure(s):  Robotic assisted laparoscopic right inguinal hernia repair       Anesthesia Type:  General    Note:     Postop Pain Control: Uneventful            Sign Out: Well controlled pain   PONV: No   Neuro/Psych: Uneventful            Sign Out: Acceptable/Baseline neuro status   Airway/Respiratory: Uneventful            Sign Out: Acceptable/Baseline resp. status   CV/Hemodynamics: Uneventful            Sign Out: Acceptable CV status; No obvious hypovolemia; No obvious fluid overload   Other NRE:    DID A NON-ROUTINE EVENT OCCUR? No           Last vitals:  Vitals Value Taken Time   /70 06/02/23 1130   Temp 36.1  C (97  F) 06/02/23 1115   Pulse 54 06/02/23 1131   Resp 13 06/02/23 1131   SpO2 98 % 06/02/23 1135   Vitals shown include unvalidated device data.    Electronically Signed By: Katharina Daugherty MD, MD  June 2, 2023  11:54 AM

## 2023-06-02 NOTE — ANESTHESIA CARE TRANSFER NOTE
Patient: Beni Chau    Procedure: Procedure(s):  Robotic assisted laparoscopic right inguinal hernia repair       Diagnosis: Right inguinal hernia [K40.90]  Diagnosis Additional Information: No value filed.    Anesthesia Type:   General     Note:    Oropharynx: oropharynx clear of all foreign objects and spontaneously breathing  Level of Consciousness: awake  Oxygen Supplementation: face mask  Level of Supplemental Oxygen (L/min / FiO2): 6  Independent Airway: airway patency satisfactory and stable  Dentition: dentition unchanged  Vital Signs Stable: post-procedure vital signs reviewed and stable  Report to RN Given: handoff report given  Patient transferred to: PACU  Comments: Neuromuscular blockade reversed after TOF 4/4, spontaneous respirations, adequate tidal volumes, followed commands to voice, oropharynx suctioned with soft flexible catheter, extubated atraumatically, extubated with suction, airway patent after extubation.  Oxygen via facemask at 6 liters per minute to PACU. Oxygen tubing connected to wall O2 in PACU, SpO2, NiBP, and EKG monitors and alarms on and functioning, Barbara Hugger warmer connected to patient gown, report on patient's clinical status given to PACU RN, RN questions answered.     Handoff Report: Identifed the Patient, Identified the Reponsible Provider, Reviewed the pertinent medical history, Discussed the surgical course, Reviewed Intra-OP anesthesia mangement and issues during anesthesia, Set expectations for post-procedure period and Allowed opportunity for questions and acknowledgement of understanding      Vitals:  Vitals Value Taken Time   BP     Temp     Pulse 68 06/02/23 1052   Resp 15 06/02/23 1052   SpO2 98 % 06/02/23 1052   Vitals shown include unvalidated device data.    Electronically Signed By: Helga Lloyd  June 2, 2023  10:53 AM

## 2023-06-02 NOTE — OP NOTE
Preoperative diagnosis: right Inguinal hernia    Postoperative diagnosis: right Inguinal hernia    Procedure: Robotic assisted laparoscopic transabdominal preperitoneal right inguinal hernia repair with mesh    Surgeon: Nicolás Muñoz MD    Assistant: Contreras Pa PA-C, Physician assistant first assistant was necessary during the performance of this procedure for expertise in patient positioning, prepping, draping, trocar placement, camera management, retraction and exposure, and suctioning.    Anesthesia: General endotracheal    Estimated blood loss: 5cc        Indication for procedure: This is a 73 yo man who presented to my office with symptomatic inguinal hernia.  We had an exhaustive discussion regarding therapeutic options.  It was ultimately elected to proceed with robotic assisted laparoscopic repair.  The potential risks of bleeding, infection, neurovascular injury to the vas deferens or testicle, bowel or bladder injury, recurrent hernia, chronic pain were all reviewed in great detail.  The patient's questions were thoroughly answered.  Informed consent was provided.    Procedure: After informed consent was obtained the patient was taken to the operating room and placed supine in the operating room table.  Following the induction of adequate general endotracheal anesthesia, the patient's abdomen was shaved, prepped and draped in usual fashion.  A surgeon initiated timeout was then completed.  Appropriate IV antibiotics were administered for surgical prophylaxis.  Local anesthetic was then injected at the site above the umbilicus at the midline.  A skin incision was made at this location and the Veress needle was passed into an intra-abdominal position.  The intra-abdominal position of the needle was confirmed using the saline drop test.  A carbon dioxide pneumoperitoneum was then established.  After adequate insufflation the insufflation needle was removed and replaced with an 8 mm robotic port.  Under  direct vision after the infiltration of local anesthetic 2 additional 8 mm ports were placed at the level of the first port in the right and left abdomen.  The robot was then uneventfully docked.  I assumed control of the surgeon console.  Starting on the right side the peritoneum was grasped well above the inguinal canal and incised. I then began dissecting into the preperitoneal plane.  This dissection was carried down into the inguinal canal.  Peritoneum was mobilized well down below Joey's ligament as well as laterally to expose the transverse abdominis.  The spermatic cord and its contents were skeletonized. Indirect Hernia was present and reduced.  I then elected to repair this utilizing a 10 x 15 cm piece of pro- mesh.  This mesh was introduced into the abdominal cavity and placed appropriately within the inguinal canal.  With the mesh in good position the peritoneum was sutured closed using 2.0 strata fix suture.  The robot was then undocked.  The trochars were opened and the carbon dioxide was massaged from the abdomen.  Trochars were subsequently removed and the skin incisions were closed with subcuticular 4-0 Vicryl sutures.  Sponge, needle and instrument counts were correct.  Patient tolerated this well.  He was awakened in the operating room, extubated and taken to the recovery room in stable condition.    Nicolás Muñoz MD

## 2023-06-02 NOTE — PLAN OF CARE
6/2/23 [id number]  Same day surgery- R inguinal hernia. Up from surgery around 1200. 3 lap sites CDI.   A&O x 4. VSS on RA. Pain managed with IV dilaudid and norco. IND in room. Diet clears with good appetite. Continent of B&B no BM this shift, voiding after procedure. Discharge home tomorrow, continue plan of care.

## 2023-06-02 NOTE — ANESTHESIA PREPROCEDURE EVALUATION
Anesthesia Pre-Procedure Evaluation    Patient: Beni Chau   MRN: [id number] : 1950        Procedure : Procedure(s):  Robotic assisted laparoscopic right inguinal hernia repair          Past Medical History:   Diagnosis Date     Anxiety      MUHAMMAD (dyspnea on exertion)      Gastrointestinal hemorrhage associated with gastric ulcer      Hypertension      Hypertrophy of prostate without urinary obstruction      Lattice degeneration of peripheral retina      Nephrolithiasis      Paroxysmal supraventricular tachycardia (H)      Pulmonary nodules      Pure hypercholesterolemia      Simple chronic bronchitis (H)      Sleep apnea      SNHL (sensorineural hearing loss)      Vitamin D deficiency       Past Surgical History:   Procedure Laterality Date     CARDIAC SURGERY      stent LAD x3     COLONOSCOPY       ESOPHAGOSCOPY, GASTROSCOPY, DUODENOSCOPY (EGD), COMBINED N/A 10/07/2015    Procedure: COMBINED ESOPHAGOSCOPY, GASTROSCOPY, DUODENOSCOPY (EGD), BIOPSY SINGLE OR MULTIPLE;  Surgeon: Robert Baugh MD;  Location:  GI     GENITOURINARY SURGERY      vasectomy     HERNIA REPAIR       KNEE SURGERY Right     arthroscopy     ORTHOPEDIC SURGERY Bilateral     shoulder surgery     SOFT TISSUE SURGERY Right     foot neuroma x 3     WRIST SURGERY Right       Allergies   Allergen Reactions     Oxycodone Nausea and Vomiting and Hives     Tamsulosin      FLOMAX; Near syncope       Aleve Rash     syncope      Social History     Tobacco Use     Smoking status: Former     Types: Cigarettes     Quit date: 10/10/2009     Years since quittin.6     Smokeless tobacco: Never   Vaping Use     Vaping status: Never Used   Substance Use Topics     Alcohol use: Yes     Comment: 3 glasses of wine/week      Wt Readings from Last 1 Encounters:   23 96.5 kg (212 lb 11.2 oz)        Anesthesia Evaluation            ROS/MED HX  ENT/Pulmonary: Comment: Chronic bronchitis  Sensironeural hearing loss    (+) sleep apnea,     Neurologic:        Cardiovascular: Comment: Paroxysmal SVT    (+) Dyslipidemia hypertension-----dysrhythmias, Other,     METS/Exercise Tolerance:     Hematologic:       Musculoskeletal:       GI/Hepatic:       Renal/Genitourinary:       Endo:       Psychiatric/Substance Use:     (+) psychiatric history anxiety     Infectious Disease:       Malignancy:       Other:            Physical Exam    Airway        Mallampati: II   TM distance: > 3 FB   Neck ROM: full   Mouth opening: > 3 cm    Respiratory Devices and Support         Dental           Cardiovascular   cardiovascular exam normal          Pulmonary   pulmonary exam normal                OUTSIDE LABS:  CBC:   Lab Results   Component Value Date    WBC 7.3 05/02/2023    WBC 5.6 10/08/2015    HGB 12.4 (L) 05/02/2023    HGB 8.9 (L) 10/08/2015    HCT 36.4 (L) 05/02/2023    HCT 27.0 (L) 10/08/2015     05/02/2023     10/08/2015     BMP:   Lab Results   Component Value Date     (L) 05/02/2023     10/08/2015    POTASSIUM 4.3 05/02/2023    POTASSIUM 4.3 10/08/2015    CHLORIDE 96 (L) 05/02/2023    CHLORIDE 109 10/08/2015    CO2 23 05/02/2023    CO2 28 10/08/2015    BUN 14.9 05/02/2023    BUN 14 10/08/2015    CR 0.92 05/02/2023    CR 1.01 10/08/2015    GLC 83 05/02/2023    GLC 95 10/08/2015     COAGS: No results found for: PTT, INR, FIBR  POC: No results found for: BGM, HCG, HCGS  HEPATIC:   Lab Results   Component Value Date    ALBUMIN 4.5 05/02/2023    PROTTOTAL 7.0 05/02/2023    ALT 29 05/02/2023    AST 25 05/02/2023    ALKPHOS 114 05/02/2023    BILITOTAL 0.4 05/02/2023    RICARDO <9 (L) 03/29/2013     OTHER:   Lab Results   Component Value Date    LOVE 8.9 05/02/2023    LIPASE 210 10/07/2015    TSH 0.61 10/04/2009       Anesthesia Plan    ASA Status:  2   NPO Status:  NPO Appropriate    Anesthesia Type: General.     - Airway: ETT   Induction: Propofol, Intravenous.   Maintenance: Balanced.        Consents    Anesthesia Plan(s) and associated risks, benefits,  and realistic alternatives discussed. Questions answered and patient/representative(s) expressed understanding.    - Discussed:     - Discussed with:  Patient         Postoperative Care    Pain management: Multi-modal analgesia.   PONV prophylaxis: Ondansetron (or other 5HT-3), Dexamethasone or Solumedrol     Comments:                Katharina Daugherty MD, MD

## 2023-06-02 NOTE — ANESTHESIA PROCEDURE NOTES
Airway       Patient location during procedure: OR       Procedure Start/Stop Times: 6/2/2023 9:34 AM  Staff -        Anesthesiologist:  Katharina Daugherty MD       CRNA: Helga Lloyd       Performed By: CRNA  Consent for Airway        Urgency: elective  Indications and Patient Condition       Indications for airway management: rufus-procedural       Induction type:intravenous       Mask difficulty assessment: 0 - not attempted    Final Airway Details       Final airway type: endotracheal airway       Successful airway: ETT - single and Oral  Endotracheal Airway Details        ETT size (mm): 8.0       Cuffed: yes       Successful intubation technique: direct laryngoscopy       DL Blade Type: MAC 4       Grade View of Cords: 2       Adjucts: stylet       Position: Right       Measured from: gums/teeth       Secured at (cm): 22       Bite block used: None    Post intubation assessment        Placement verified by: capnometry, equal breath sounds and chest rise        Number of attempts at approach: 1       Number of other approaches attempted: 0       Secured with: pink tape       Ease of procedure: easy       Dentition: Intact and Unchanged    Medication(s) Administered   Medication Administration Time: 6/2/2023 9:34 AM

## 2023-06-02 NOTE — PROGRESS NOTES
PTA medications updated by Medication Scribe prior to surgery via phone call with patient (last doses completed by Nurse)     Medication history sources: Patient, Surescripts and H&P  In the past week, patient estimated taking medication this percent of the time: Greater than 90%      Significant changes made to the medication list:  Patient reports no longer taking the following meds (med scribe removed from PTA med list): Plavix, Lisinopril, Metoprolol Succinate, Naprosyn, Vitamin D3,      Additional medication history information:   None    Medication reconciliation completed by provider prior to medication history? No    Time spent in this activity: 40 minutes    The information provided in this note is only as accurate as the sources available at the time of update(s)      Prior to Admission medications    Medication Sig Last Dose Taking? Auth Provider Long Term End Date   aspirin 81 MG EC tablet Take 1 tablet (81 mg) by mouth daily 5/25/2023 at AM Yes Molina Calix MD     atorvastatin (LIPITOR) 80 MG tablet Take 80 mg by mouth 5/25/2023 at AM Yes Reported, Patient Yes    celecoxib (CELEBREX) 100 MG capsule Take 100 mg by mouth 5/25/2023 at PM Yes Reported, Patient Yes    Garlic 1000 MG CAPS Take 1 capsule by mouth daily 5/25/2023 at AM Yes Reported, Patient     loratadine (CLARITIN) 10 MG tablet Take 10 mg by mouth daily as needed for allergies Unknown at PRN Yes Reported, Patient     metoprolol tartrate (LOPRESSOR) 25 MG tablet Take 25 mg by mouth 2 times daily  at AM Yes Reported, Patient Yes    Nitroglycerin (NITROSTAT SL) Place 0.4 mg under the tongue every 5 minutes as needed Has not taken at PRN Yes Reported, Patient Yes    sodium fluoride dental gel (PREVIDENT) 1.1 % GEL topical gel Apply to affected area 2 times daily 6/1/2023 at PM Yes Reported, Patient     vitamin B-Complex Take 1 tablet by mouth daily 5/25/2023 at AM Yes Reported, Patient     vitamin E 400 units TABS Take 400 Units by mouth  daily 5/25/2023 at AM Yes Reported, Patient     cholecalciferol 125 MCG (5000 UT) CAPS Take 5,000 Units by mouth   Reported, Patient

## 2023-06-02 NOTE — BRIEF OP NOTE
"[facility]    Brief Operative Note    Pre-operative diagnosis: Right inguinal hernia [K40.90]  Post-operative diagnosis Right Inguinal Hernia    Procedure: Procedure(s):  Robotic assisted laparoscopic right inguinal hernia repair with mesh    Surgeon: Surgeon(s) and Role:     * Nicolás Muñoz MD - Primary     * Reinaldo Pa PA-C - Assisting     Anesthesia: General   Estimated Blood Loss: Less than 10 ml    Drains: None  Specimens: * No specimens in log *  Findings:   None.  Complications: None.  Implants:   Implant Name Type Inv. Item Serial No.  Lot No. LRB No. Used Action   MESH PROGRIP LAPAROSCOPIC 5.9X3.9\" PARIETEX SELF-FIX [id number] - [id number] Mesh MESH PROGRIP LAPAROSCOPIC 5.9X3.9\" PARIETEX SELF-FIX [id number]  COVIDI [id number] Right 1 Implanted     Pt to stay overnight as lives alone.  No complications intraop.    Reinaldo Pa PA-C      "

## 2023-06-03 VITALS
DIASTOLIC BLOOD PRESSURE: 72 MMHG | HEART RATE: 60 BPM | SYSTOLIC BLOOD PRESSURE: 100 MMHG | OXYGEN SATURATION: 95 % | HEIGHT: 70 IN | BODY MASS INDEX: 30.45 KG/M2 | TEMPERATURE: 97.4 F | WEIGHT: 212.7 LBS | RESPIRATION RATE: 18 BRPM

## 2023-06-03 LAB — GLUCOSE BLDC GLUCOMTR-MCNC: 121 MG/DL (ref 70–99)

## 2023-06-03 PROCEDURE — 250N000013 HC RX MED GY IP 250 OP 250 PS 637: Performed by: HOSPITALIST

## 2023-06-03 PROCEDURE — 250N000013 HC RX MED GY IP 250 OP 250 PS 637: Performed by: PHYSICIAN ASSISTANT

## 2023-06-03 PROCEDURE — 82962 GLUCOSE BLOOD TEST: CPT

## 2023-06-03 PROCEDURE — 258N000003 HC RX IP 258 OP 636: Performed by: PHYSICIAN ASSISTANT

## 2023-06-03 RX ADMIN — HYDROCODONE BITARTRATE AND ACETAMINOPHEN 2 TABLET: 5; 325 TABLET ORAL at 00:21

## 2023-06-03 RX ADMIN — ASPIRIN 81 MG: 81 TABLET, COATED ORAL at 08:46

## 2023-06-03 RX ADMIN — ATORVASTATIN CALCIUM 80 MG: 40 TABLET, FILM COATED ORAL at 08:46

## 2023-06-03 RX ADMIN — METOPROLOL TARTRATE 25 MG: 25 TABLET, FILM COATED ORAL at 08:45

## 2023-06-03 RX ADMIN — SODIUM CHLORIDE, POTASSIUM CHLORIDE, SODIUM LACTATE AND CALCIUM CHLORIDE: 600; 310; 30; 20 INJECTION, SOLUTION INTRAVENOUS at 00:24

## 2023-06-03 ASSESSMENT — ACTIVITIES OF DAILY LIVING (ADL)
ADLS_ACUITY_SCORE: 18

## 2023-06-03 NOTE — DISCHARGE SUMMARY
"Discharge Summary    Beni Chau MRN# [id number]   YOB: 1950 Age: 72 year old     Date of Admission:  6/2/2023  Date of Discharge:  6/3/2023  Admitting Physician:  Nicolás Muñoz MD  Discharge Physician:  Benny Arellano MD  Discharging Service:  General Surgery     Primary Provider: Deion Shah          Admission Diagnoses:   Right inguinal hernia [K40.90]          Discharge Diagnosis:   Patient Active Problem List   Diagnosis     GI bleed     Right inguinal hernia     Urinary hesitancy     Enlarged prostate     Calculus of gallbladder without cholecystitis without obstruction                Discharge Disposition:   Discharged to home           Condition on Discharge:   Discharge condition: Stable   Discharge vitals: Blood pressure 100/72, pulse 60, temperature 97.4  F (36.3  C), temperature source Oral, resp. rate 18, height 1.778 m (5' 10\"), weight 96.5 kg (212 lb 11.2 oz), SpO2 95 %.     Code status on discharge: Full Code           Procedures / Labs / Imaging:   Robotic assisted right inguinal hernia repair with Dr. Muñoz on 6/2/2023.          Medications Prior to Admission:     Medications Prior to Admission   Medication Sig Dispense Refill Last Dose     aspirin 81 MG EC tablet Take 1 tablet (81 mg) by mouth daily   5/25/2023 at AM     atorvastatin (LIPITOR) 80 MG tablet Take 80 mg by mouth   5/25/2023 at AM     celecoxib (CELEBREX) 100 MG capsule Take 100 mg by mouth   5/25/2023 at PM     Garlic 1000 MG CAPS Take 1 capsule by mouth daily   5/25/2023 at AM     loratadine (CLARITIN) 10 MG tablet Take 10 mg by mouth daily as needed for allergies   6/1/2023 at PRN     metoprolol tartrate (LOPRESSOR) 25 MG tablet Take 25 mg by mouth 2 times daily   6/2/2023 at 0530     Nitroglycerin (NITROSTAT SL) Place 0.4 mg under the tongue every 5 minutes as needed   Has not taken at PRN     sodium fluoride dental gel (PREVIDENT) 1.1 % GEL topical gel Apply to affected area 2 times daily   " 6/1/2023 at PM     vitamin B-Complex Take 1 tablet by mouth daily   5/25/2023 at AM     vitamin E 400 units TABS Take 400 Units by mouth daily   5/25/2023 at AM     cholecalciferol 125 MCG (5000 UT) CAPS Take 5,000 Units by mouth   5/25/2023             Discharge Medications:     Current Discharge Medication List      START taking these medications    Details   HYDROcodone-acetaminophen (NORCO) 5-325 MG tablet Take 1-2 tablets by mouth every 4 hours as needed for moderate to severe pain  Qty: 20 tablet, Refills: 0    Associated Diagnoses: Right inguinal hernia      senna-docusate (SENOKOT-S/PERICOLACE) 8.6-50 MG tablet Take 1-2 tablets by mouth 2 times daily as needed for constipation (While on oral opioids.)  Qty: 12 tablet, Refills: 0    Associated Diagnoses: Right inguinal hernia         CONTINUE these medications which have NOT CHANGED    Details   aspirin 81 MG EC tablet Take 1 tablet (81 mg) by mouth daily    Comments: RESTART 10/10/2015.  Associated Diagnoses: Coronary artery disease involving native coronary artery without angina pectoris      atorvastatin (LIPITOR) 80 MG tablet Take 80 mg by mouth      celecoxib (CELEBREX) 100 MG capsule Take 100 mg by mouth      Garlic 1000 MG CAPS Take 1 capsule by mouth daily      loratadine (CLARITIN) 10 MG tablet Take 10 mg by mouth daily as needed for allergies      metoprolol tartrate (LOPRESSOR) 25 MG tablet Take 25 mg by mouth 2 times daily      Nitroglycerin (NITROSTAT SL) Place 0.4 mg under the tongue every 5 minutes as needed      sodium fluoride dental gel (PREVIDENT) 1.1 % GEL topical gel Apply to affected area 2 times daily      vitamin B-Complex Take 1 tablet by mouth daily      vitamin E 400 units TABS Take 400 Units by mouth daily      cholecalciferol 125 MCG (5000 UT) CAPS Take 5,000 Units by mouth                   Consultations:   No consultations were requested during this admission             Brief History of Illness:   Beni Chau is a 72 year  "old male who was admitted following an elective robotic inguinal hernia repair with Dr. Muñoz on 6/2.  He was planned for postoperative admission as he lives alone but did not have anybody to assist him after surgery on postop day 0.            Hospital Course:   Beni Chau is a 72 year old male who was admitted following an elective robotic inguinal hernia repair with Dr. Muñoz on 6/2.  He was planned for postoperative admission as he lives alone but did not have anybody to assist him after surgery on postop day 0.  This morning he is feeling well and denies any significant pain.  He is appropriately tender at his incision sites.  His incisions have Band-Aids intact without any surrounding erythema or drainage.  He is tolerating a diet and ambulating appropriately.      Final Day of Progress before Discharge:       Assessment and Plan:  Principal Problem:    Right inguinal hernia    Assessment: Postop day 1 following laparoscopic right inguinal hernia repair    Plan: Okay to discharge today.  Tolerating diet, pain well controlled.          Interval History:  Patient reports feeling well this morning.  Reports he did not sleep very much overnight.  Denies any significant pain, minimal incisional pain at this time.  Ambulating appropriately in room.          Physical Exam:  /72 (BP Location: Right arm)   Pulse 60   Temp 97.4  F (36.3  C) (Oral)   Resp 18   Ht 1.778 m (5' 10\")   Wt 96.5 kg (212 lb 11.2 oz)   SpO2 95%   BMI 30.52 kg/m    Gen:  Awake, Alert, NAD  Resp - nonlabored breathing on RA  Cardiac - Regular rate & rhythm  Abdomen -soft, appropriately tender, nondistended.  Laparoscopic incision sites with Band-Aids intact, no surrounding erythema or drainage.  Extremities - no lower extremity edema.             Data:  I reviewed all labs and imaging over the last 24 hours       Significant Results:   None             Pending Results:   None           Discharge Instructions and Follow-Up: "   Discharge diet: Regular   Discharge activity:  No lifting more than 20 pounds for 2 weeks   Discharge follow-up:  As needed   Outpatient therapy: None    Home Care agency: None    Supplies and equipment: None   Lines and drains: None    Wound care: None   Other instructions: None      Benny Washington MD, MS 06/03/23 9:51 AM   General Surgery Resident - PGY4

## 2023-06-03 NOTE — PLAN OF CARE
Goal Outcome Evaluation:      Plan of Care Reviewed With: patient    Overall Patient Progress: improvingOverall Patient Progress: improving      Shift: POD1 Hernia repair, overnight stay for observation since patient lives alone    Orientation: A+Ox4    Vitals/Tele: Sinus Bradycardia overnight (48-50 bpm) otherwise VSS on RA. Pain managed with PRN Boomer.     IV Access/drains: PIV infusing    Diet: Clear liquid tolerated well    Mobility: Independent in room    GI/: Voiding adequately in bathroom, hypoactive/faint bowel sounds.    Wound/Skin: 3 lap sites c/d/i    Discharge Plan: Plan for discharge today     See Flow sheets for assessment

## 2023-06-03 NOTE — PROVIDER NOTIFICATION
MD Notification    Notified Person: MD    Notified Person Name:  Annette    Notification Date/Time: 0015 6/3/2023    Notification Interaction: Phone call    Purpose of Notification: Patient has no VS orders or parameter orders on scheduled Lopressor, HR fluctuating between 48-50.     Orders Received: Per MD will place parameter orders on scheduled Lopressor.    Comments:

## 2023-06-03 NOTE — PLAN OF CARE
Discharge instructions reviewed with pt.  IV removed. Discharge medications discussed, and given to pt.  Pt left unit and walked to exit at 1140.  Pt friend is picking him up.  Denied pain at time of discharge.

## 2023-06-16 ENCOUNTER — TELEPHONE (OUTPATIENT)
Dept: SURGERY | Facility: CLINIC | Age: 73
End: 2023-06-16
Payer: COMMERCIAL

## 2023-06-16 NOTE — TELEPHONE ENCOUNTER
[facility]  Post op call note     Beni Chau was called for an update regarding his recovery.  He underwent a robotic assisted lap right inguinal hernia repair with mesh by Dr. Muñoz on June / 02 / 2023.  Today he tells me he is doing well and denies any complaints.  He is eating a normal diet and his bowels are regular.  He states his wounds are healing well and denies any erythema or drainage at his wounds.       He was instructed to gradually resume all normal activities. The patient states all of his questions were answered and he agrees to follow up in clinic as needed.    Contreras aP PA-C        Please route or send letter to:  Primary Care Provider (PCP)

## (undated) DEVICE — PREP CHLORAPREP 26ML TINTED HI-LITE ORANGE 930815

## (undated) DEVICE — LUBRICANT INST ELECTROLUBE EL101

## (undated) DEVICE — BLADE CLIPPER 4406

## (undated) DEVICE — DAVINCI XI OBTURATOR BLADELESS 8MM 470359

## (undated) DEVICE — EVAC SYSTEM CLEAR FLOW SC082500

## (undated) DEVICE — GLOVE BIOGEL PI SZ 8.0 40880

## (undated) DEVICE — LINEN TOWEL PACK X5 5464

## (undated) DEVICE — SYSTEM LAPAROVUE VISIBILITY LAPVUE10

## (undated) DEVICE — SUCTION CANISTER MEDIVAC LINER 3000ML W/LID 65651-530

## (undated) DEVICE — ESU GROUND PAD UNIVERSAL W/O CORD

## (undated) DEVICE — DRAPE SHEET REV FOLD 3/4 9349

## (undated) DEVICE — LIGHT HANDLE X2

## (undated) DEVICE — SU VICRYL 4-0 PS-2 18" UND J496H

## (undated) DEVICE — SYR 10ML FINGER CONTROL W/O NDL 309695

## (undated) DEVICE — GOWN IMPERVIOUS SPECIALTY XLG/XLONG 32474

## (undated) DEVICE — SU STRATAFIX PDS PLUS 2-0 SPIRAL SH 23CM SXPP1B433

## (undated) DEVICE — PACK LAP CHOLE SLC15LCFSD

## (undated) DEVICE — SOL WATER IRRIG 1000ML BOTTLE 2F7114

## (undated) DEVICE — DAVINCI XI DRAPE ARM 470015

## (undated) DEVICE — SU VICRYL 3-0 SH 27" J316H

## (undated) DEVICE — DAVINCI HOT SHEARS TIP COVER  400180

## (undated) DEVICE — DAVINCI XI DRAPE COLUMN 470341

## (undated) DEVICE — DAVINCI XI GRASPER ENDOWRIST PROGRASP 470093

## (undated) DEVICE — NDL INSUFFLATION 13GA 120MM C2201

## (undated) DEVICE — DAVINCI XI SEAL UNIVERSAL 5-8MM 470361

## (undated) RX ORDER — PROPOFOL 10 MG/ML
INJECTION, EMULSION INTRAVENOUS
Status: DISPENSED
Start: 2023-06-02

## (undated) RX ORDER — FENTANYL CITRATE 50 UG/ML
INJECTION, SOLUTION INTRAMUSCULAR; INTRAVENOUS
Status: DISPENSED
Start: 2023-06-02

## (undated) RX ORDER — HYDROMORPHONE HYDROCHLORIDE 1 MG/ML
INJECTION, SOLUTION INTRAMUSCULAR; INTRAVENOUS; SUBCUTANEOUS
Status: DISPENSED
Start: 2023-06-02

## (undated) RX ORDER — FENTANYL CITRATE 0.05 MG/ML
INJECTION, SOLUTION INTRAMUSCULAR; INTRAVENOUS
Status: DISPENSED
Start: 2023-06-02

## (undated) RX ORDER — BUPIVACAINE HYDROCHLORIDE AND EPINEPHRINE 5; 5 MG/ML; UG/ML
INJECTION, SOLUTION EPIDURAL; INTRACAUDAL; PERINEURAL
Status: DISPENSED
Start: 2023-06-02

## (undated) RX ORDER — CEFAZOLIN SODIUM/WATER 2 G/20 ML
SYRINGE (ML) INTRAVENOUS
Status: DISPENSED
Start: 2023-06-02

## (undated) RX ORDER — ONDANSETRON 2 MG/ML
INJECTION INTRAMUSCULAR; INTRAVENOUS
Status: DISPENSED
Start: 2023-06-02

## (undated) RX ORDER — DEXAMETHASONE SODIUM PHOSPHATE 4 MG/ML
INJECTION, SOLUTION INTRA-ARTICULAR; INTRALESIONAL; INTRAMUSCULAR; INTRAVENOUS; SOFT TISSUE
Status: DISPENSED
Start: 2023-06-02